# Patient Record
Sex: FEMALE | Race: WHITE
[De-identification: names, ages, dates, MRNs, and addresses within clinical notes are randomized per-mention and may not be internally consistent; named-entity substitution may affect disease eponyms.]

---

## 2018-03-13 ENCOUNTER — HOSPITAL ENCOUNTER (INPATIENT)
Dept: HOSPITAL 36 - TELE | Age: 78
LOS: 6 days | Discharge: NURSING, REHAB OR LONG-TERM CARE | DRG: 177 | End: 2018-03-19
Attending: FAMILY MEDICINE | Admitting: FAMILY MEDICINE
Payer: MEDICARE

## 2018-03-13 VITALS — DIASTOLIC BLOOD PRESSURE: 35 MMHG | SYSTOLIC BLOOD PRESSURE: 99 MMHG

## 2018-03-13 DIAGNOSIS — G47.33: ICD-10-CM

## 2018-03-13 DIAGNOSIS — M81.0: ICD-10-CM

## 2018-03-13 DIAGNOSIS — J69.0: Primary | ICD-10-CM

## 2018-03-13 DIAGNOSIS — E66.01: ICD-10-CM

## 2018-03-13 DIAGNOSIS — J44.1: ICD-10-CM

## 2018-03-13 DIAGNOSIS — I49.9: ICD-10-CM

## 2018-03-13 DIAGNOSIS — M54.17: ICD-10-CM

## 2018-03-13 DIAGNOSIS — J44.0: ICD-10-CM

## 2018-03-13 DIAGNOSIS — I50.9: ICD-10-CM

## 2018-03-13 DIAGNOSIS — N18.2: ICD-10-CM

## 2018-03-13 DIAGNOSIS — I25.110: ICD-10-CM

## 2018-03-13 DIAGNOSIS — E11.22: ICD-10-CM

## 2018-03-13 DIAGNOSIS — J96.00: ICD-10-CM

## 2018-03-13 DIAGNOSIS — I42.9: ICD-10-CM

## 2018-03-13 DIAGNOSIS — I13.0: ICD-10-CM

## 2018-03-13 PROCEDURE — X6118: HCPCS

## 2018-03-13 PROCEDURE — Z7610: HCPCS

## 2018-03-14 LAB
ALBUMIN SERPL-MCNC: 3.7 GM/DL (ref 3.7–5.3)
ALBUMIN/GLOB SERPL: 1.2 {RATIO} (ref 1–1.8)
ALP SERPL-CCNC: 75 U/L (ref 34–104)
ALT SERPL-CCNC: 23 U/L (ref 7–52)
ANION GAP SERPL CALC-SCNC: 11.6 MMOL/L (ref 7–16)
ANION GAP SERPL CALC-SCNC: 7.9 MMOL/L (ref 7–16)
AST SERPL-CCNC: 24 U/L (ref 13–39)
BASOPHILS # BLD AUTO: 0 TH/CUMM (ref 0–0.2)
BASOPHILS # BLD AUTO: 0.1 TH/CUMM (ref 0–0.2)
BASOPHILS NFR BLD AUTO: 0.1 % (ref 0–2)
BASOPHILS NFR BLD AUTO: 1.1 % (ref 0–2)
BILIRUB SERPL-MCNC: 0.5 MG/DL (ref 0.3–1)
BUN SERPL-MCNC: 21 MG/DL (ref 7–25)
BUN SERPL-MCNC: 22 MG/DL (ref 7–25)
CALCIUM SERPL-MCNC: 9.5 MG/DL (ref 8.6–10.3)
CALCIUM SERPL-MCNC: 9.8 MG/DL (ref 8.6–10.3)
CHLORIDE SERPL-SCNC: 100 MEQ/L (ref 98–107)
CHLORIDE SERPL-SCNC: 102 MEQ/L (ref 98–107)
CHOLEST SERPL-MCNC: 243 MG/DL (ref ?–200)
CHOLEST SERPL-MCNC: 249 MG/DL (ref ?–200)
CO2 SERPL-SCNC: 27.6 MEQ/L (ref 21–31)
CO2 SERPL-SCNC: 28.8 MEQ/L (ref 21–31)
CREAT SERPL-MCNC: 0.9 MG/DL (ref 0.6–1.2)
CREAT SERPL-MCNC: 1 MG/DL (ref 0.6–1.2)
EOSINOPHIL # BLD AUTO: 0 TH/CMM (ref 0.1–0.4)
EOSINOPHIL # BLD AUTO: 0 TH/CMM (ref 0.1–0.4)
EOSINOPHIL NFR BLD AUTO: 0.1 % (ref 0–5)
EOSINOPHIL NFR BLD AUTO: 0.1 % (ref 0–5)
ERYTHROCYTE [DISTWIDTH] IN BLOOD BY AUTOMATED COUNT: 16.5 % (ref 11.5–20)
ERYTHROCYTE [DISTWIDTH] IN BLOOD BY AUTOMATED COUNT: 16.7 % (ref 11.5–20)
GLOBULIN SER-MCNC: 3.2 GM/DL
GLUCOSE SERPL-MCNC: 191 MG/DL (ref 70–105)
GLUCOSE SERPL-MCNC: 302 MG/DL (ref 70–105)
HBA1C MFR BLD: 7.9 % (ref 4–6)
HCT VFR BLD CALC: 34 % (ref 41–60)
HCT VFR BLD CALC: 35.2 % (ref 41–60)
HDLC SERPL-MCNC: 45 MG/DL (ref 23–92)
HDLC SERPL-MCNC: 48 MG/DL (ref 23–92)
HGB BLD-MCNC: 11.3 GM/DL (ref 12–16)
HGB BLD-MCNC: 11.6 GM/DL (ref 12–16)
HGB BLD-MCNC: 12 G/DL (ref 4–35)
LYMPHOCYTE AB SER FC-ACNC: 0.5 TH/CMM (ref 1.5–3)
LYMPHOCYTE AB SER FC-ACNC: 1 TH/CMM (ref 1.5–3)
LYMPHOCYTES NFR BLD AUTO: 13.5 % (ref 20–50)
LYMPHOCYTES NFR BLD AUTO: 8.6 % (ref 20–50)
MCH RBC QN AUTO: 28.1 PG (ref 27–31)
MCH RBC QN AUTO: 28.2 PG (ref 27–31)
MCHC RBC AUTO-ENTMCNC: 32.8 PG (ref 28–36)
MCHC RBC AUTO-ENTMCNC: 33.3 PG (ref 28–36)
MCV RBC AUTO: 84.9 FL (ref 81–100)
MCV RBC AUTO: 85.6 FL (ref 81–100)
MONOCYTES # BLD AUTO: 0.1 TH/CMM (ref 0.3–1)
MONOCYTES # BLD AUTO: 0.4 TH/CMM (ref 0.3–1)
MONOCYTES NFR BLD AUTO: 1.8 % (ref 2–10)
MONOCYTES NFR BLD AUTO: 5.9 % (ref 2–10)
NEUTROPHILS # BLD: 5 TH/CMM (ref 1.8–8)
NEUTROPHILS # BLD: 6 TH/CMM (ref 1.8–8)
NEUTROPHILS NFR BLD AUTO: 79.4 % (ref 40–80)
NEUTROPHILS NFR BLD AUTO: 89.4 % (ref 40–80)
PLATELET # BLD: 134 TH/CMM (ref 150–400)
PLATELET # BLD: 145 TH/CMM (ref 150–400)
PMV BLD AUTO: 9.3 FL
PMV BLD AUTO: 9.4 FL
POTASSIUM SERPL-SCNC: 3.7 MEQ/L (ref 3.5–5.1)
POTASSIUM SERPL-SCNC: 4.2 MEQ/L (ref 3.5–5.1)
RBC # BLD AUTO: 4.01 MIL/CMM (ref 3.8–5.2)
RBC # BLD AUTO: 4.12 MIL/CMM (ref 3.8–5.2)
SODIUM SERPL-SCNC: 135 MEQ/L (ref 136–145)
SODIUM SERPL-SCNC: 135 MEQ/L (ref 136–145)
TRIGL SERPL-MCNC: 128 MG/DL (ref ?–150)
TRIGL SERPL-MCNC: 137 MG/DL (ref ?–150)
WBC # BLD AUTO: 5.6 TH/CMM (ref 4.8–10.8)
WBC # BLD AUTO: 7.5 TH/CMM (ref 4.8–10.8)

## 2018-03-14 PROCEDURE — 5A09357 ASSISTANCE WITH RESPIRATORY VENTILATION, LESS THAN 24 CONSECUTIVE HOURS, CONTINUOUS POSITIVE AIRWAY PRESSURE: ICD-10-PCS | Performed by: FAMILY MEDICINE

## 2018-03-14 RX ADMIN — LACTULOSE PRN GM: 20 SOLUTION ORAL at 12:14

## 2018-03-14 RX ADMIN — IPRATROPIUM BROMIDE AND ALBUTEROL SULFATE SCH ML: .5; 3 SOLUTION RESPIRATORY (INHALATION) at 12:22

## 2018-03-14 RX ADMIN — IPRATROPIUM BROMIDE AND ALBUTEROL SULFATE SCH ML: .5; 3 SOLUTION RESPIRATORY (INHALATION) at 07:51

## 2018-03-14 RX ADMIN — INSULIN ASPART SCH UNITS: 100 INJECTION, SOLUTION INTRAVENOUS; SUBCUTANEOUS at 18:09

## 2018-03-14 RX ADMIN — HYDROCODONE BITATRATE AND ACETAMINOPHEN PRN TAB: 10; 325 TABLET ORAL at 11:12

## 2018-03-14 RX ADMIN — INSULIN ASPART SCH UNITS: 100 INJECTION, SOLUTION INTRAVENOUS; SUBCUTANEOUS at 00:43

## 2018-03-14 RX ADMIN — PANTOPRAZOLE SODIUM SCH MG: 40 TABLET, DELAYED RELEASE ORAL at 16:16

## 2018-03-14 RX ADMIN — INSULIN ASPART SCH UNITS: 100 INJECTION, SOLUTION INTRAVENOUS; SUBCUTANEOUS at 12:39

## 2018-03-14 RX ADMIN — IPRATROPIUM BROMIDE AND ALBUTEROL SULFATE SCH ML: .5; 3 SOLUTION RESPIRATORY (INHALATION) at 19:40

## 2018-03-14 RX ADMIN — INSULIN ASPART SCH UNITS: 100 INJECTION, SOLUTION INTRAVENOUS; SUBCUTANEOUS at 08:47

## 2018-03-14 RX ADMIN — IPRATROPIUM BROMIDE AND ALBUTEROL SULFATE SCH ML: .5; 3 SOLUTION RESPIRATORY (INHALATION) at 01:18

## 2018-03-14 NOTE — DIAGNOSTIC IMAGING REPORT
CHEST X-RAY: AP view



INDICATION: Pneumonia



COMPARISON: None



FINDINGS: Congestive changes are seen.  There may be a small left

effusion.  Cardiomegaly is noted with atherosclerosis.  Degenerative

changes of the spine are noted.



IMPRESSION: Congestive changes.  Pneumonia of the mid to lower lungs is

cannot be excluded.



Cardiomegaly and atherosclerotic vascular disease.

## 2018-03-14 NOTE — CONSULTATION
DATE OF CONSULTATION:  03/14/2018



PATIENT OF:  Dr. Urena.



HISTORY AND PHYSICAL:  This is a 77-year-old morbidly obese female patient,

recently had been complaining of shortness of breath, cough with expectoration. 

The patient was seen in the Emergency Room at [facility].  The patient was

found to have pneumonia.  The patient also had slightly elevated troponin level.

 Following this, the patient was transferred to [facility] due

to insurance reasons.



PAST MEDICAL HISTORY:  Hypertension, obstructive sleep apnea, angina,

lumbosacral radiculopathy, diabetes mellitus type 2, diabetic CKD stage II,

osteoporosis, and morbid obesity.



FAMILY HISTORY:  Unremarkable.



SOCIAL HISTORY:  No history of smoking, alcohol abuse.



ALLERGIES:  No known allergies.



PHYSICAL EXAMINATION:

VITAL SIGNS:  Blood pressure 130/70, pulse 70, and respirations 20.

HEAD:  Normocephalic.  No lumps or bumps.

EYES:  Pupils are equal and reactive to light.  Fundi show AV nicking.  Sclerae

white.  Conjunctivae pink.

NECK:  Carotid 2+.  Normal upstroke.  JVD flat. Thyroid not palpable.  Lymph

nodes not palpable.

CHEST:  Shows increased AP diameter.  No kyphosis or scoliosis.

LUNGS:  Bilateral bronchovesicular breath sounds.  Bilateral wheezing, rhonchi,

prolonged expiration.

HEART:  PMI fifth intercostal space with lateral to midclavicular line.  S1 and

S2.  No S3.  Soft S4.  Soft systolic murmur.

ABDOMEN:  Soft.  Liver and spleen not palpable.  Morbid obesity, no

organomegaly.

EXTREMITIES:  Peripheral pulses 1+.  No pedal edema.



CLINICAL IMPRESSION:  Pneumonia; acute respiratory failure, on BiPAP;

hypertension; obstructive sleep apnea; angina; lumbosacral radiculopathy;

diabetes mellitus type 2; diabetic CKD stage II; morbid obesity; and

osteoporosis.



PLAN:  We will get troponin level, EKG, and echocardiogram.  IV antibiotics and

monitor the patient.





DD: 03/14/2018 13:44

DT: 03/14/2018 19:37

[facility]# [id number]  [id number]

## 2018-03-14 NOTE — INTERNAL MEDICINE PROG NOTE
Internal Medicine Objective





- Results


Result Diagrams: 


 18 05:42





 18 05:42


Recent Labs: 


 Laboratory Last Values











WBC  5.6 Th/cmm (4.8-10.8)   18  05:42    


 


RBC  4.01 Mil/cmm (3.80-5.20)   18  05:42    


 


Hgb  11.3 gm/dL (12-16)  L  18  05:42    


 


Hct  34.0 % (41.0-60)  L  18  05:42    


 


MCV  84.9 fl ()   18  05:42    


 


MCH  28.2 pg (27.0-31.0)   18  05:42    


 


MCHC Differential  33.3 pg (28.0-36.0)   18  05:42    


 


RDW  16.7 % (11.5-20.0)   18  05:42    


 


Plt Count  134 Th/cmm (150-400)  L  18  05:42    


 


MPV  9.4 fl  18  05:42    


 


Neutrophils %  89.4 % (40.0-80.0)  H  18  05:42    


 


Lymphocytes %  8.6 % (20.0-50.0)  L  18  05:42    


 


Monocytes %  1.8 % (2.0-10.0)  L  18  05:42    


 


Eosinophils %  0.1 % (0.0-5.0)   18  05:42    


 


Basophils %  0.1 % (0.0-2.0)   18  05:42    


 


Sodium  135 mEq/L (136-145)  L  18  05:42    


 


Potassium  4.2 mEq/L (3.5-5.1)   18  05:42    


 


Chloride  100 mEq/L ()   18  05:42    


 


Carbon Dioxide  27.6 mEq/L (21.0-31.0)   18  05:42    


 


Anion Gap  11.6  (7.0-16.0)   18  05:42    


 


BUN  21 mg/dL (7-25)   18  05:42    


 


Creatinine  1.0 mg/dL (0.6-1.2)   18  05:42    


 


Est GFR ( Amer)  TNP   18  05:42    


 


Est GFR (Non-Af Amer)  TNP   18  05:42    


 


BUN/Creatinine Ratio  21.0   18  05:42    


 


Glucose  302 mg/dL ()  H  18  05:42    


 


POC Glucose  248 MG/DL (70 - 105)  H  18  16:15    


 


Calcium  9.5 mg/dL (8.6-10.3)   18  05:42    


 


Total Bilirubin  0.5 mg/dL (0.3-1.0)   18  05:42    


 


AST  24 U/L (13-39)   18  05:42    


 


ALT  23 U/L (7-52)   18  05:42    


 


Alkaline Phosphatase  75 U/L ()   18  05:42    


 


B-Natriuretic Peptide  399.0 pg/mL (5.0-100.0)  H  18  05:42    


 


Total Protein  6.9 gm/dL (6.0-8.3)   18  05:42    


 


Albumin  3.7 gm/dL (3.7-5.3)   18  05:42    


 


Globulin  3.2 gm/dL  18  05:42    


 


Albumin/Globulin Ratio  1.2  (1.0-1.8)   18  05:42    


 


Triglycerides  128 mg/dL (<150)   18  05:42    


 


Cholesterol  243 mg/dL (<200)  H  18  05:42    


 


LDL Cholesterol Direct  160 mg/dL ()   18  05:42    


 


HDL Cholesterol  45 mg/dL (23-92)   18  05:42    














- Physical Exam


Vitals and I&O: 


 Vital Signs











Temp  97.7 F   18 16:00


 


Pulse  82   18 16:00


 


Resp  17   18 16:52


 


BP  116/62   18 16:00


 


Pulse Ox  96   18 16:00








 Intake & Output











 18





 18:59 06:59 18:59


 


Weight (lbs)  105.687 kg 











Active Medications: 


Current Medications





Acetaminophen (Tylenol)  650 mg PO Q4H PRN


   PRN Reason: Pain (Mild)


   Stop: 18 08:49


Acetaminophen/Hydrocodone Bitart (Norco 10 Mg/325 Mg)  1 tab PO Q6HR PRN


   PRN Reason: Pain (Moderate)


   Stop: 18 09:55


   Last Admin: 18 11:12 Dose:  1 tab


Albuterol Sulfate (Albuterol 2.5mg/3ml Neb Ud)  2.5 mg HHN DAILYRT PRN


   PRN Reason: wheezing


   Stop: 18 09:59


Albuterol/Ipratropium (Duoneb Neb)  3 ml HHN Q6HRT [facility]


   Stop: 18 00:59


   Last Admin: 18 12:22 Dose:  3 ml


Amiodarone HCl (Cordarone)  100 mg PO DAILY [facility]


   Stop: 18 08:59


Artificial Tears (Artificial Tears Ophth Soln)  1 drop EACH EYE DAILY [facility]


   Stop: 18 08:59


Aspirin (Ecotrin)  81 mg PO DAILY [facility]


   Stop: 18 08:59


Bisacodyl (Dulcolax 5 Mg Ec Tab)  5 mg PO DAILY [facility]


   Stop: 18 08:59


Docusate Sodium (Colace)  100 mg PO BID PRN


   PRN Reason: Constipation


   Stop: 18 09:55


Furosemide (Lasix)  40 mg PO DAILY [facility]


   Stop: 18 08:59


Gabapentin (Neurontin)  1,200 mg PO TID [facility]


   Stop: 18 13:59


   Last Admin: 18 13:27 Dose:  1,200 mg


Insulin Aspart (Novolog Insulin Sliding Scale)  0 units SUBQ Q6HR [facility]


   PRN Reason: Protocol


   Stop: 18 00:00


   Last Admin: 18 12:39 Dose:  8 units


Insulin Detemir (Levemir Insulin)   units SUBQ ACHS [facility]


   PRN Reason: Protocol


   Stop: 18 11:29


Lactulose (Cephulac)  20 gm PO DAILY PRN


   PRN Reason: Constipation


   Stop: 18 09:55


   Last Admin: 18 12:14 Dose:  20 gm


Levothyroxine Sodium (Synthroid)  0.125 mg PO QDAC [facility]


   Stop: 18 07:29


Metformin HCl (Glucophage)  1,000 mg PO BID [facility]


   Stop: 18 16:59


   Last Admin: 18 16:16 Dose:  1,000 mg


Miscellaneous (Clonazepam [Klonopin])  1 tab PO HS [facility]


   Stop: 18 20:59


Miscellaneous (Glipizide [Glucotrol])  1 tab PO BID [facility]


   Stop: 18 16:59


   Last Admin: 18 16:16 Dose:  Not Given


Pantoprazole Sodium (Protonix)  40 mg PO BID [facility]


   Stop: 18 16:59


   Last Admin: 18 16:16 Dose:  40 mg


Potassium Chloride (Klor-Con)  20 meq PO DAILY [facility]


   Stop: 18 08:59


Sitagliptin Phosphate (Januvia)  50 mg PO BID [facility]


   Stop: 18 16:59


   Last Admin: 18 16:16 Dose:  50 mg


Tramadol HCl (Ultram)  50 mg PO TID [facility]


   Stop: 18 13:59


   Last Admin: 18 13:26 Dose:  50 mg











Nutritional Asmnt/Malnutr-PDOC





- Dietary Evaluation


Malnutrition Findings (Please click <Entered> for more info): 








Nutritional Asmnt/Malnutrition                             Start:  18 15:

31


Text:                                                      Status: Complete    

  


Freq:                                                                          

  


 Document     18 15:31  LCHENG  (Rec: 18 15:43  LCHENG  [id number])


 Nutritional Asmnt/Malnutrition


     Patient General Information


      Nutritional Screening                      High Risk


      Diagnosis                                  PNA


      Pertinent Medical Hx/Surgical Hx           no H&P, not able to obtain


      Subjective Information                      at admitting noted. pt


                                                 seen lying in bed with lunch


                                                 tray in her front. Pt is


                                                 Cam[place] speaking. Per RN, pt


                                                 consumed <25% of lunch and


                                                 refused to eat, complaining


                                                 stomach pain. PO intake 50% of


                                                 breakfast this morning. Pt is


                                                 on insulin noted.


      Current Diet Order/ Nutrition Support      low sodium 2gm


      Pertinent Medications                      lasix, novolog, levemir,


                                                 synthroid, glucophage,


                                                 protonix, kcal


      Pertinent Labs                             3/14 Na 135, Glucose 302, POC


                                                 277-346


                                                 3/13 


     Nutritional Hx/Data


      Height                                     1.47 m


      Height (Calculated Centimeters)            147.3


      Current Weight (lbs)                       105.687 kg


      Weight (Calculated Kilograms)              105.7


      Weight (Calculated Grams)                  865865.0


      Ideal Body Weight                          96


      Body Mass Index (BMI)                      48.6


      Weight Status                              Obese


     GI Symptoms


      GI Symptoms                                None


      Last BM                                    no record


      Difficult in:                              None


      Skin Integrity/Comment:                    intact


      Current %PO                                Poor (25-49%)


     Estimated Nutritional Goals


      BEE in Kcals:                              Adj wt of IBW


      Calories/Kcals/Kg                          30-35


      Kcals Calculated                           7091-2450


      Protein:                                   Adj wt of IBW


      Protein g/k-1.2


      Protein Calculated                         59-71


      Fluid: ml                                  1770-2065ml (1ml/kcal)


     Nutritional Problem


      1. Problem


       Problem                                   altered nutrition related labs


       Etiology                                  endocrine dysfunction


       Signs/Symptoms:                           Glucose 302, -346


     Malnutrition Alert


      Protein-Calorie Malnutrition               N/A


      Is there a minimum of two criteria         No


       selected?                                 


       Query Text:Check all the applicable       


       criteria. A minimum of two criteria are   


       recommended for diagnosis of either       


       severe or non-severe malnutrition.        


     Intervention/Recommendation


      Comments                                   1. Recommend adding CCHO-60gm


                                                 diet d/t hyperglycemia and on


                                                 insulin. RN notified, will


                                                 talk to MD.


                                                 2. Monitor PO intake, wt, labs


                                                 and skin integrity


                                                 3. F/U as high risk in 2-3


                                                 days, 3/16-3/17


     Expected Outcomes/Goals


      Expected Outcomes/Goals                    1. PO intake to meet at least


                                                 75% of nutritional needs.


                                                 2. Wt stability, skin to


                                                 remain intact, labs to


                                                 approach WNL.

## 2018-03-15 LAB
ANION GAP SERPL CALC-SCNC: 14 MMOL/L (ref 7–16)
BASOPHILS # BLD AUTO: 0 TH/CUMM (ref 0–0.2)
BASOPHILS NFR BLD AUTO: 0.1 % (ref 0–2)
BUN SERPL-MCNC: 23 MG/DL (ref 7–25)
CALCIUM SERPL-MCNC: 9.7 MG/DL (ref 8.6–10.3)
CHLORIDE SERPL-SCNC: 101 MEQ/L (ref 98–107)
CO2 SERPL-SCNC: 28.1 MEQ/L (ref 21–31)
CREAT SERPL-MCNC: 1 MG/DL (ref 0.6–1.2)
EOSINOPHIL # BLD AUTO: 0 TH/CMM (ref 0.1–0.4)
EOSINOPHIL NFR BLD AUTO: 0.5 % (ref 0–5)
ERYTHROCYTE [DISTWIDTH] IN BLOOD BY AUTOMATED COUNT: 16.8 % (ref 11.5–20)
GLUCOSE SERPL-MCNC: 150 MG/DL (ref 70–105)
HCT VFR BLD CALC: 33.8 % (ref 41–60)
HGB BLD-MCNC: 11 GM/DL (ref 12–16)
LYMPHOCYTE AB SER FC-ACNC: 1.2 TH/CMM (ref 1.5–3)
LYMPHOCYTES NFR BLD AUTO: 16.3 % (ref 20–50)
MCH RBC QN AUTO: 27.6 PG (ref 27–31)
MCHC RBC AUTO-ENTMCNC: 32.4 PG (ref 28–36)
MCV RBC AUTO: 85.1 FL (ref 81–100)
MONOCYTES # BLD AUTO: 0.4 TH/CMM (ref 0.3–1)
MONOCYTES NFR BLD AUTO: 5.7 % (ref 2–10)
NEUTROPHILS # BLD: 5.7 TH/CMM (ref 1.8–8)
NEUTROPHILS NFR BLD AUTO: 77.4 % (ref 40–80)
PLATELET # BLD: 147 TH/CMM (ref 150–400)
PMV BLD AUTO: 10.2 FL
POTASSIUM SERPL-SCNC: 4.1 MEQ/L (ref 3.5–5.1)
RBC # BLD AUTO: 3.97 MIL/CMM (ref 3.8–5.2)
SODIUM SERPL-SCNC: 139 MEQ/L (ref 136–145)
WBC # BLD AUTO: 7.3 TH/CMM (ref 4.8–10.8)

## 2018-03-15 RX ADMIN — LACTULOSE PRN GM: 20 SOLUTION ORAL at 12:50

## 2018-03-15 RX ADMIN — INSULIN ASPART SCH UNITS: 100 INJECTION, SOLUTION INTRAVENOUS; SUBCUTANEOUS at 00:53

## 2018-03-15 RX ADMIN — POLYVINYL ALCOHOL SCH DROP: 14 SOLUTION/ DROPS OPHTHALMIC at 09:28

## 2018-03-15 RX ADMIN — INSULIN ASPART SCH UNITS: 100 INJECTION, SOLUTION INTRAVENOUS; SUBCUTANEOUS at 17:53

## 2018-03-15 RX ADMIN — PANTOPRAZOLE SODIUM SCH MG: 40 TABLET, DELAYED RELEASE ORAL at 09:21

## 2018-03-15 RX ADMIN — PANTOPRAZOLE SODIUM SCH MG: 40 TABLET, DELAYED RELEASE ORAL at 16:22

## 2018-03-15 RX ADMIN — DEXTROMETHORPHAN HBR AND GUAIFENESIN PRN ML: 10; 100 SOLUTION ORAL at 00:52

## 2018-03-15 RX ADMIN — INSULIN ASPART SCH UNITS: 100 INJECTION, SOLUTION INTRAVENOUS; SUBCUTANEOUS at 06:54

## 2018-03-15 RX ADMIN — IPRATROPIUM BROMIDE AND ALBUTEROL SULFATE SCH ML: .5; 3 SOLUTION RESPIRATORY (INHALATION) at 07:23

## 2018-03-15 RX ADMIN — IPRATROPIUM BROMIDE AND ALBUTEROL SULFATE SCH ML: .5; 3 SOLUTION RESPIRATORY (INHALATION) at 13:19

## 2018-03-15 RX ADMIN — INSULIN ASPART SCH UNITS: 100 INJECTION, SOLUTION INTRAVENOUS; SUBCUTANEOUS at 12:48

## 2018-03-15 RX ADMIN — IPRATROPIUM BROMIDE AND ALBUTEROL SULFATE SCH ML: .5; 3 SOLUTION RESPIRATORY (INHALATION) at 01:15

## 2018-03-15 RX ADMIN — IPRATROPIUM BROMIDE AND ALBUTEROL SULFATE SCH ML: .5; 3 SOLUTION RESPIRATORY (INHALATION) at 19:29

## 2018-03-15 RX ADMIN — POTASSIUM CHLORIDE SCH MEQ: 20 TABLET, EXTENDED RELEASE ORAL at 09:21

## 2018-03-15 RX ADMIN — DEXTROMETHORPHAN HBR AND GUAIFENESIN PRN ML: 10; 100 SOLUTION ORAL at 09:27

## 2018-03-15 RX ADMIN — HYDROCODONE BITATRATE AND ACETAMINOPHEN PRN TAB: 10; 325 TABLET ORAL at 12:50

## 2018-03-15 RX ADMIN — LEVOTHYROXINE SODIUM SCH: 125 TABLET ORAL at 11:00

## 2018-03-15 NOTE — HISTORY & PHYSICAL
ADMIT DATE:  03/15/2018



CHIEF COMPLAINT:  Shortness of breath.



HISTORY OF PRESENT ILLNESS:  This is a 77-year-old  female who has a

2-day history of shortness of breath associated with productive cough with thick

greenish secretions.  The patient was seen in the Emergency Room at [facility].  Due to insurance purposes, the patient is now admitted here to

[facility].



PAST MEDICAL HISTORY:  Hypertension, obstructive sleep apnea, angina,

lumbosacral radiculopathy, diabetes type 2, diabetic CKD stage 2, osteoporosis,

morbid obesity.



FAMILY HISTORY:  Noncontributory.



SOCIAL HISTORY:  Denies any alcohol, illicit drug usage or any tobacco smoking.



ALLERGIES:  No drug allergies.



REVIEW OF SYSTEMS:

GENERAL:  Denies any fevers and chills.

CARDIOVASCULAR:  Denies chest pain.

RESPIRATORY:  The patient complains of shortness of breath and productive cough.

GASTROINTESTINAL:  Denies nausea, vomiting, abdominal pain.

GENITOURINARY:  Denies increased frequency or dysuria.

NEUROLOGIC:  No headaches, seizures or syncope.

All other systems are reviewed and are negative.



PHYSICAL EXAMINATION:  This is a 77-year-old female, awake, alert and appears

stated age, in no apparent distress.

VITAL SIGNS:  Temperature 98.7, heart rate 83, blood pressure 115/63,

respirations 17, O2 96%.

HEENT:  Head; normocephalic, atraumatic.

NECK:  Supple.  No mass.

LUNGS:  Rhonchi bilaterally with some slight wheezes.

HEART:  No murmurs, no gallops.

ABDOMEN:  Soft, nontender, nondistended.



LABORATORY DATA:  WBC 7.3, H and H 11.0 and 32.8, platelet of 147.



Sodium 139, potassium 4.1, chloride of 101, BUN 23, creatinine 1.0.  Troponin

0.08.



DIAGNOSTICS:  The patient had a chest x-ray done.  Impression is congestive

changes and pneumonia of mid to lower lung cannot be excluded, cardiomegaly,

atherosclerotic vascular disease.



ASSESSMENT:  Pneumonia, obstructive sleep apnea on BiPAP, hypertension, morbid

obesity.  Lumbosacral radiculopathy, elevated troponin, type 2 diabetes,

diabetic CKD stage 2, osteoporosis.



PLAN:  We will get Pulmonology on the case. We will get Cardiology as well,

breathing treatments, BiPAP standby.  We will continue to follow this patient.





DD: 03/15/2018 13:59

DT: 03/15/2018 16:04

[facility]# [id number]  [id number]

## 2018-03-15 NOTE — HISTORY & PHYSICAL
ADMIT DATE:  



HISTORY OF PRESENT ILLNESS:  A 77-year-old female patient.  She presented to the

[place] Emergency Room initially with a 3-day history of cough, fever and

shortness of breath, was diagnosed to have pneumonia and had also COPD

exacerbation and I got a call from them and the patient was transferred to

[facility] for continuation of her care.



PAST MEDICAL HISTORY:  Obstructive sleep apnea, history of diabetes, history of

hypertension, history of coronary artery disease, and history of lumbar

radiculopathy.



ALLERGIES:  NOTED IN THE CHART.



SOCIAL HISTORY:  Does not smoke.



REVIEW OF SYSTEMS:  Other than shortness of breath, fever for the last several

days, everything else was negative.



PHYSICAL EXAMINATION:

GENERAL:  The patient is an elderly female, very obese.  She is slightly short

of breath, complaining of headache at this time.

HEAD:  Normal.

ENT:  Normal.

NECK:  Supple and nontender.

LUNGS:  Bilateral rhonchi.

CARDIOVASCULAR SYSTEM:  S1, S2 heard.

ABDOMEN:  Soft.  Bowel sounds are heard.

NEUROLOGIC:  The patient is alert, oriented.

VITAL SIGNS:  Pulse was high and blood pressure was 107/62.



LABORATORY DATA:  I reviewed her other labs from [place], was normal except

potassium was 3.4.



DIAGNOSES:  Acute respiratory distress, respiratory insufficiency, left lower

lobe infiltrate, history of obesity, history of obstructive sleep apnea, history

of lumbar radiculopathy, history of neuropathy, history of diabetes, history of

organ dysfunction.  The patient was admitted and I will go ahead and continue

her antibiotics.  I will have ID doctor, Dr. Barnard see the patient as well as I

will have Dr. Yvon Barnard for Pulmonary.  I will follow the patient.





DD: 03/15/2018 08:17

DT: 03/15/2018 11:14

[facility]# [id number]  [id number]

## 2018-03-15 NOTE — GENERAL PROGRESS NOTE
Subjective





- Review of Systems


Events since last encounter: 





patient with cough + sob 


 pneumonia 





Objective





- Results


Result Diagrams: 


 18 20:21





 18 20:21


Recent Labs: 


 Laboratory Last Values











WBC  7.5 Th/cmm (4.8-10.8)  D 18  20:21    


 


RBC  4.12 Mil/cmm (3.80-5.20)   18  20:21    


 


Hgb  11.6 gm/dL (12-16)  L  18  20:21    


 


Hct  35.2 % (41.0-60)  L  18  20:21    


 


MCV  85.6 fl ()   18  20:    


 


MCH  28.1 pg (27.0-31.0)   18  20:    


 


MCHC Differential  32.8 pg (28.0-36.0)   18  20:    


 


RDW  16.5 % (11.5-20.0)   18  20:    


 


Plt Count  145 Th/cmm (150-400)  L  18  20:21    


 


MPV  9.3 fl  18  20:21    


 


Neutrophils %  79.4 % (40.0-80.0)   18  20:21    


 


Lymphocytes %  13.5 % (20.0-50.0)  L  18  20:    


 


Monocytes %  5.9 % (2.0-10.0)   18  20:21    


 


Eosinophils %  0.1 % (0.0-5.0)   18  20:    


 


Basophils %  1.1 % (0.0-2.0)   18  20:21    


 


Sodium  135 mEq/L (136-145)  L  18  20:21    


 


Potassium  3.7 mEq/L (3.5-5.1)   18  20:21    


 


Chloride  102 mEq/L ()   18  20:21    


 


Carbon Dioxide  28.8 mEq/L (21.0-31.0)   18  20:21    


 


Anion Gap  7.9  (7.0-16.0)   18  20:21    


 


BUN  22 mg/dL (7-25)   18  20:21    


 


Creatinine  0.9 mg/dL (0.6-1.2)   18  20:21    


 


Est GFR ( Amer)  TNP   18  20:21    


 


Est GFR (Non-Af Amer)  TNP   18  20:21    


 


BUN/Creatinine Ratio  24.4   18  20:21    


 


Glucose  191 mg/dL ()  H D 18  20:21    


 


POC Glucose  159 MG/DL (70 - 105)  H  03/15/18  00:42    


 


Hemoglobin A1c %  7.9 % (4.0-6.0)  H  18  05:42    


 


Calcium  9.8 mg/dL (8.6-10.3)   18  20:21    


 


Total Bilirubin  0.5 mg/dL (0.3-1.0)   18  05:42    


 


AST  24 U/L (13-39)   18  05:42    


 


ALT  23 U/L (7-52)   18  05:42    


 


Alkaline Phosphatase  75 U/L ()   18  05:42    


 


B-Natriuretic Peptide  558.0 pg/mL (5.0-100.0)  H  03/15/18  06:02    


 


Total Protein  6.9 gm/dL (6.0-8.3)   18  05:42    


 


Albumin  3.7 gm/dL (3.7-5.3)   18  05:42    


 


Globulin  3.2 gm/dL  18  05:42    


 


Albumin/Globulin Ratio  1.2  (1.0-1.8)   18  05:42    


 


Triglycerides  137 mg/dL (<150)   18  20:21    


 


Cholesterol  249 mg/dL (<200)  H  18  20:21    


 


LDL Cholesterol Direct  174 mg/dL ()   18  20:21    


 


HDL Cholesterol  48 mg/dL (23-92)   18  20:21    


 


TSH  6.80 uIU/ml (0.34-5.60)  H  18  20:21    














- Physical Exam


Vitals and I&O: 


 Vital Signs











Temp  97.3 F   03/15/18 00:00


 


Pulse  83   03/15/18 07:25


 


Resp  22   03/15/18 07:25


 


BP  114/59   03/15/18 00:00


 


Pulse Ox  98   03/15/18 07:25








 Intake & Output











 03/14/18 03/15/18 03/15/18





 18:59 06:59 18:59


 


Intake Total  550 


 


Balance  550 


 


Weight (lbs)  105.687 kg 


 


Intake:   


 


  Oral  550 


 


Other:   


 


  # Voids  3 


 


  # Bowel Movements  0 











Active Medications: 


Current Medications





Acetaminophen (Tylenol)  650 mg PO Q4H PRN


   PRN Reason: Pain (Mild)


   Stop: 18 08:49


   Last Admin: 03/15/18 06:52 Dose:  650 mg


Acetaminophen/Hydrocodone Bitart (Norco 10 Mg/325 Mg)  1 tab PO Q6HR PRN


   PRN Reason: Pain (Moderate)


   Stop: 18 09:55


   Last Admin: 18 11:12 Dose:  1 tab


Albuterol Sulfate (Albuterol 2.5mg/3ml Neb Ud)  2.5 mg HHN DAILYRT PRN


   PRN Reason: wheezing


   Stop: 18 09:59


Albuterol/Ipratropium (Duoneb Neb)  3 ml HHN Q6HRT WALESKA


   Stop: 18 00:59


   Last Admin: 03/15/18 07:23 Dose:  3 ml


Albuterol/Ipratropium (Duoneb Neb)  3 ml HHN Q2H PRN


   PRN Reason: Wheezing


   Stop: 18 21:48


Amiodarone HCl (Cordarone)  100 mg PO DAILY WALESKA


   Stop: 18 08:59


Artificial Tears (Artificial Tears Ophth Soln)  1 drop EACH EYE DAILY WALESKA


   Stop: 18 08:59


Aspirin (Ecotrin)  81 mg PO DAILY WALESKA


   Stop: 18 08:59


Bisacodyl (Dulcolax 5 Mg Ec Tab)  5 mg PO DAILY WALESKA


   Stop: 18 08:59


Clonazepam (Klonopin)  2 mg PO HS WALESKA


   Stop: 18 20:59


Docusate Sodium (Colace)  100 mg PO BID PRN


   PRN Reason: Constipation


   Stop: 18 09:55


Furosemide (Lasix)  40 mg PO DAILY WALESKA


   Stop: 18 08:59


Gabapentin (Neurontin)  1,200 mg PO TID W[facility]ESKA


   Stop: 18 13:59


   Last Admin: 18 22:45 Dose:  1,200 mg


Glipizide [facility]ucotrol)  [facility]


   Stop: 18 16:59


Guaifenesin/Dextromethorphan (Robitussin Dm)  10 ml PO TID PRN


   PRN Reason: Cough


   Stop: 18 21:49


   Last Admin: 03/15/18 00:52 Dose:  10 ml


Insulin Aspart (Novolog Insulin Sliding Scale)  0 units SUBQ Q6HR WALESKA


   PRN Reason: Protocol


   Stop: 18 00:00


   Last Admin: 03/15/18 06:54 Dose:  2 units


Insulin Detemir (Levemir Insulin)   units SUBQ ACHS WALESKA


   PRN Reason: Protocol


   Stop: 18 11:29


Lactulose (Cephulac)  20 gm PO DAILY PRN


   PRN Reason: Constipation


   Stop: 18 09:55


   Last Admin: 18 12:14 Dose:  20 gm


Levothyroxine Sodium (Synthroid)  0.125 mg PO [facility]DAC [facility]


   Stop: 18 07:29


Metformin HCl (Glucophage)  1,000 mg PO BID [facility]


   Stop: 18 16:59


   Last Admin: 18 16:16 Dose:  1,000 mg


Miscellaneous (Vancomycin Iv Per Pharmacy)  1 ea MC PRN PRN


   PRN Reason: PROTOCOL


   Stop: 18 05:32


Pantoprazole Sodium (Protonix)  40 mg PO BID [facility]


   Stop: 18 16:59


   Last Admin: 18 16:16 Dose:  40 mg


Potassium Chloride (Klor-Con)  20 meq PO DAILY [facility]


   Stop: 18 08:59


Sitagliptin Phosphate (Januvia)  50 mg PO BID [facility]


   Stop: 18 16:59


   Last Admin: 18 16:16 Dose:  50 mg


Tramadol HCl (Ultram)  50 mg PO TID [facility]


   Stop: 18 13:59


   Last Admin: 18 22:45 Dose:  50 mg











- Procedures


Procedures: 


 Procedures











Procedure Code Date


 


ASSISTANCE WITH RESPIRATORY VENTILATION, <24 HRS, CPAP [id number] 18


 


POS AIRWAY PRESSURE CPAP [id number] 18














Nutritional Asmnt/Malnutr-PDOC





- Dietary Evaluation


Malnutrition Findings (Please click <Entered> for more info): 








Nutritional Asmnt/Malnutrition                             Start:  18 15:

31


Text:                                                      Status: Complete    

  


Freq:                                                                          

  


 Document     18 15:31  LCANGELA  (Rec: 18 15:43  LCANGELA [id number])


 Nutritional Asmnt/Malnutrition


     Patient General Information


      Nutritional Screening                      High Risk


      Diagnosis                                  PNA


      Pertinent Medical Hx/Surgical Hx           no H&P, not able to obtain


      Subjective Information                      at admitting noted. pt


                                                 seen lying in bed with lunch


                                                 tray in her front. Pt is


                                                 Omani speaking. Per RN, pt


                                                 consumed <25% of lunch and


                                                 refused to eat, complaining


                                                 stomach pain. PO intake 50% of


                                                 breakfast this morning. Pt is


                                                 on insulin noted.


      Current Diet Order/ Nutrition Support      low sodium 2gm


      Pertinent Medications                      lasix, novolog, levemir,


                                                 synthroid, glucophage,


                                                 protonix, kcal


      Pertinent Labs                             3/14 Na 135, Glucose 302, POC


                                                 277-346


                                                 3/13 


     Nutritional Hx/Data


      Height                                     1.47 m


      Height (Calculated Centimeters)            147.3


      Current Weight (lbs)                       105.687 kg


      Weight (Calculated Kilograms)              105.7


      Weight (Calculated Grams)                  561988.0


      Ideal Body Weight                          96


      Body Mass Index (BMI)                      48.6


      Weight Status                              Obese


     GI Symptoms


      GI Symptoms                                None


      Last BM                                    no record


      Difficult in:                              None


      Skin Integrity/Comment:                    intact


      Current %PO                                Poor (25-49%)


     Estimated Nutritional Goals


      BEE in Kcals:                              Adj wt of IBW


      Calories/Kcals/Kg                          30-35


      Kcals Calculated                           1264-2713


      Protein:                                   Adj wt of IBW


      Protein g/k-1.2


      Protein Calculated                         59-71


      Fluid: ml                                  1770-2065ml (1ml/kcal)


     Nutritional Problem


      1. Problem


       Problem                                   altered nutrition related labs


       Etiology                                  endocrine dysfunction


       Signs/Symptoms:                           Glucose 302, -346


     Malnutrition Alert


      Protein-Calorie Malnutrition               N/A


      Is there a minimum of two criteria         No


       selected?                                 


       Query Text:Check all the applicable       


       criteria. A minimum of two criteria are   


       recommended for diagnosis of either       


       severe or non-severe malnutrition.        


     Intervention/Recommendation


      Comments                                   1. Recommend adding CCHO-60gm


                                                 diet d/t hyperglycemia and on


                                                 insulin. RN notified, will


                                                 talk to MD.


                                                 2. Monitor PO intake, wt, labs


                                                 and skin integrity


                                                 3. F/U as high risk in 2-3


                                                 days, 3/16-3/17


     Expected Outcomes/Goals


      Expected Outcomes/Goals                    1. PO intake to meet at least


                                                 75% of nutritional needs.


                                                 2. Wt stability, skin to


                                                 remain intact, labs to


                                                 approach WNL.

## 2018-03-15 NOTE — CONSULTATION
Consult Note





- Consult Note


Service Date: 03/15/18


Referring Physician: Jay Urena


Consult Note: 


PHYSICIAN Consultation Note:





Date of Admission: 03/13/18





Purpose of Consultation: Pneumonia.





Chief Complaint: 


Patient AIXA NORRIS was admitted to [facility] Telemetry with PNA & 

TACHYCARDIA.





History of Present Illness:


The patient is a 77-year-old female with a past


medical history of obstructive sleep apnea, diabetes mellitus type 2, CHF,


cardiomyopathy, coronary artery disease, hypertension, history of lumbar


radiculopathy, and history of cardiac arrhythmias, went to [facility] ER


for cough, fever, and shortness of breath.  The patient was diagnosed to have


pneumonia and COPD exacerbation.  The patient was transferred to [facility] for further care.  The patient still complains of shortness


of breath with improvement in her cough.  Antibiotic wise, the patient was


already started on vancomycin and Zosyn.  Chest x-ray showed congestive changes,


pneumonia of mid to lower lung.





Past Medical History: obstructive sleep apnea, diabetes mellitus type 2, CHF,


cardiomyopathy, coronary artery disease, hypertension, history of lumbar


radiculopathy, and history of cardiac arrhythmias





Diagnoses





TYPE 2 DIABETES MELLITUS W DIABETIC CHRONIC KIDNEY DISEASE (03/13/18)


MORBID (SEVERE) OBESITY DUE TO EXCESS CALORIES (03/13/18)


OBSTRUCTIVE SLEEP APNEA (ADULT) (PEDIATRIC) (03/13/18)


HYPERTENSIVE CHRONIC KIDNEY DISEASE W STG 1-4/UNSP CHR KDNY (03/13/18)


ANGINA PECTORIS, UNSPECIFIED (03/13/18)


PNEUMONIA, UNSPECIFIED ORGANISM (03/13/18)


ACUTE RESPIRATORY FAILURE, UNSP W HYPOXIA OR HYPERCAPNIA (03/13/18)


RADICULOPATHY, LUMBOSACRAL REGION (03/13/18)


AGE-RELATED OSTEOPOROSIS W/O CURRENT PATHOLOGICAL FRACTURE (03/13/18)


CHRONIC KIDNEY DISEASE, STAGE 2 (MILD) (03/13/18)


BODY MASS INDEX (BMI) 50-59.9 , ADULT (03/13/18)





Allergies











Allergy/AdvReac Type Severity Reaction Status Date / Time


 


No Known Allergies Allergy   Verified 03/13/18 23:44








 Vital Signs











Temp  96.7 F   03/15/18 16:00


 


Pulse  91   03/15/18 16:00


 


Resp  17   03/15/18 16:00


 


BP  116/59   03/15/18 16:00


 


Pulse Ox  95   03/15/18 16:00








 Intake & Output











 03/14/18 03/15/18 03/15/18





 18:59 06:59 18:59


 


Intake Total  550 


 


Balance  550 


 


Weight (lbs)  105.687 kg 


 


Intake:   


 


  Oral  550 


 


Other:   


 


  # Voids  3 


 


  # Bowel Movements  0 








 Laboratory Results - last 24 hr











  03/14/18 03/14/18 03/14/18





  05:42 20:21 20:21


 


WBC   7.5  D 


 


RBC   4.12 


 


Hgb   11.6 L 


 


Hct   35.2 L 


 


MCV   85.6 


 


MCH   28.1 


 


MCHC Differential   32.8 


 


RDW   16.5 


 


Plt Count   145 L 


 


MPV   9.3 


 


Neutrophils %   79.4 


 


Lymphocytes %   13.5 L 


 


Monocytes %   5.9 


 


Eosinophils %   0.1 


 


Basophils %   1.1 


 


Sodium   


 


Potassium   


 


Chloride   


 


Carbon Dioxide   


 


Anion Gap   


 


BUN   


 


Creatinine   


 


Est GFR ( Amer)   


 


Est GFR (Non-Af Amer)   


 


BUN/Creatinine Ratio   


 


Glucose   


 


POC Glucose   


 


Hemoglobin A1c %  7.9 H  


 


Calcium   


 


Troponin I   


 


B-Natriuretic Peptide   


 


Triglycerides   


 


Cholesterol   


 


LDL Cholesterol Direct   


 


HDL Cholesterol   


 


TSH    6.80 H














  03/14/18 03/14/18 03/15/18





  20:21 20:21 00:42


 


WBC   


 


RBC   


 


Hgb   


 


Hct   


 


MCV   


 


MCH   


 


MCHC Differential   


 


RDW   


 


Plt Count   


 


MPV   


 


Neutrophils %   


 


Lymphocytes %   


 


Monocytes %   


 


Eosinophils %   


 


Basophils %   


 


Sodium   135 L 


 


Potassium   3.7 


 


Chloride   102 


 


Carbon Dioxide   28.8 


 


Anion Gap   7.9 


 


BUN   22 


 


Creatinine   0.9 


 


Est GFR ( Amer)   TNP 


 


Est GFR (Non-Af Amer)   TNP 


 


BUN/Creatinine Ratio   24.4 


 


Glucose   191 H D 


 


POC Glucose    159 H


 


Hemoglobin A1c %   


 


Calcium   9.8 


 


Troponin I   


 


B-Natriuretic Peptide   


 


Triglycerides  137  


 


Cholesterol  249 H  


 


LDL Cholesterol Direct  174  


 


HDL Cholesterol  48  


 


TSH   














  03/15/18 03/15/18 03/15/18





  06:02 06:02 06:02


 


WBC    7.3


 


RBC    3.97


 


Hgb    11.0 L


 


Hct    33.8 L


 


MCV    85.1


 


MCH    27.6


 


MCHC Differential    32.4


 


RDW    16.8


 


Plt Count    147 L


 


MPV    10.2


 


Neutrophils %    77.4


 


Lymphocytes %    16.3 L


 


Monocytes %    5.7


 


Eosinophils %    0.5


 


Basophils %    0.1


 


Sodium   


 


Potassium   


 


Chloride   


 


Carbon Dioxide   


 


Anion Gap   


 


BUN   


 


Creatinine   


 


Est GFR ( Amer)   


 


Est GFR (Non-Af Amer)   


 


BUN/Creatinine Ratio   


 


Glucose   


 


POC Glucose   


 


Hemoglobin A1c %   


 


Calcium   


 


Troponin I   0.08 H* 


 


B-Natriuretic Peptide  558.0 H  


 


Triglycerides   


 


Cholesterol   


 


LDL Cholesterol Direct   


 


HDL Cholesterol   


 


TSH   














  03/15/18 03/15/18 03/15/18





  06:02 11:01 16:25


 


WBC   


 


RBC   


 


Hgb   


 


Hct   


 


MCV   


 


MCH   


 


MCHC Differential   


 


RDW   


 


Plt Count   


 


MPV   


 


Neutrophils %   


 


Lymphocytes %   


 


Monocytes %   


 


Eosinophils %   


 


Basophils %   


 


Sodium  139  


 


Potassium  4.1  


 


Chloride  101  


 


Carbon Dioxide  28.1  


 


Anion Gap  14.0  


 


BUN  23  


 


Creatinine  1.0  


 


Est GFR ( Amer)  TNP  


 


Est GFR (Non-Af Amer)  TNP  


 


BUN/Creatinine Ratio  23.0  


 


Glucose  150 H  


 


POC Glucose   241 H  172 H


 


Hemoglobin A1c %   


 


Calcium  9.7  


 


Troponin I   


 


B-Natriuretic Peptide   


 


Triglycerides   


 


Cholesterol   


 


LDL Cholesterol Direct   


 


HDL Cholesterol   


 


TSH   








Home Medication











 Medication  Instructions  Recorded  Type


 


Albuterol Sulfate 1 vial HHN DAILY PRN 03/14/18 History


 


Amiodarone HCl [Amiodarone HCl*] 100 mg PO DAILY 03/14/18 History


 


Aspirin EC [Ecotrin] 1 tab PO DAILY 03/14/18 History


 


Bisacodyl [Dulcolax 5 Mg Ec Tab] 1 tab PO DAILY 03/14/18 History


 


Clonazepam [Klonopin] 1 tab PO HS 03/14/18 History


 


Docusate Sodium [Colace] 1 cap PO BID PRN 03/14/18 History


 


Furosemide [Lasix] 40 mg PO DAILY 03/14/18 History


 


Gabapentin [Neurontin] 1,200 mg PO TID 03/14/18 History


 


Glipizide [Glucotrol] 1 tab PO BID 03/14/18 History


 


Hydrocodone/APAP 10 mg/325 mg 1 tab PO Q6HR PRN 03/14/18 History





[Norco 10 mg/325 mg]   


 


Insulin Detemir [Levemir] 0 unit SQ PRN 03/14/18 History


 


Lactulose [Generlac] 20 gm PO PRN 03/14/18 History


 


Levothyroxine [Synthroid] 125 mcg PO DAILY 03/14/18 History


 


Pantoprazole [Protonix] 40 mg PO BID 03/14/18 History


 


Polyethylene Glycol/Polyvinyl 1 drop OP DAILY 03/14/18 History





[Hypotears Eye Drops]   


 


Potassium Chloride ER [Klor-Con] 20 meq PO DAILY 03/14/18 History


 


Sitagliptin Phos/Metformin HCl 1 tab PO BID 03/14/18 History





[Janumet 50-1,000 mg Tablet]   


 


Tramadol HCl [Ultram] 50 mg PO TID 03/14/18 History








Current Medications











Generic Name Dose Route Start Last Admin





  Trade Name Freq  PRN Reason Stop Dose Admin


 


Acetaminophen  650 mg  03/14/18 08:50  03/15/18 06:52





  Tylenol  PO  05/13/18 08:49  650 mg





  Q4H PRN   Administration





  Pain (Mild)   


 


Acetaminophen/Hydrocodone Bitart  1 tab  03/14/18 09:56  03/15/18 12:50





  Norco 10 Mg/325 Mg  PO  05/13/18 09:55  1 tab





  Q6HR PRN   Administration





  Pain (Moderate)   


 


Albuterol Sulfate  2.5 mg  03/14/18 10:00  





  Albuterol 2.5mg/3ml Neb Ud  [facility]  05/13/18 09:59  





  DAILYRT PRN   





  wheezing   


 


Albuterol/Ipratropium  3 ml  03/14/18 01:00  03/15/18 13:19





  Duoneb Neb  [facility]  05/13/18 00:59  3 ml





  Q6HRT WALESKA   Administration


 


Albuterol/Ipratropium  3 ml  03/14/18 21:49  





  Duoneb Neb  [facility]  05/13/18 21:48  





  Q2H PRN   





  Wheezing   


 


Amiodarone HCl  100 mg  03/15/18 09:00  03/15/18 09:21





  Cordarone  PO  05/14/18 08:59  100 mg





  DAILY WALESKA   Administration


 


Artificial Tears  1 drop  03/15/18 09:00  03/15/18 09:28





  Artificial Tears Ophth Soln  EACH EYE  05/14/18 08:59  1 drop





  DAILY WALESKA   Administration


 


Aspirin  81 mg  03/15/18 09:00  03/15/18 09:20





  Ecotrin  PO  05/14/18 08:59  81 mg





  DAILY WALESKA   Administration


 


Bisacodyl  5 mg  03/15/18 09:00  03/15/18 09:21





  Dulcolax 5 Mg Ec Tab  PO  05/14/18 08:59  5 mg





  DAILY WALESKA   Administration


 


Clonazepam  2 mg  03/14/18 21:00  





  Klonopin  PO  05/13/18 20:59  





  HS WALESKA   


 


Docusate Sodium  100 mg  03/14/18 09:56  





  Colace  PO  05/13/18 09:55  





  BID PRN   





  Constipation   


 


Furosemide  40 mg  03/15/18 09:00  03/15/18 09:20





  Lasix  PO  05/14/18 08:59  40 mg





  DAILY WALESKA   Administration


 


Gabapentin  1,200 mg  03/14/18 14:00  03/15/18 14:13





  Neurontin  PO  05/13/18 13:59  1,200 mg





  TID WALESKA   Administration


 


Glipizide  10 mg  03/15/18 07:30  03/15/18 16:22





  Glucotrol  PO  05/13/18 16:59  10 mg





  BIDAC WALESKA   Administration


 


Guaifenesin/Dextromethorphan  10 ml  03/14/18 21:50  03/15/18 09:27





  Robitussin Dm  PO  05/13/18 21:49  10 ml





  TID PRN   Administration





  Cough   


 


Vancomycin HCl 1 gm/ Sodium  250 mls @ 165 mls/hr  03/15/18 12:00  03/15/18 12:

42





  Chloride  IV  05/14/18 11:59  165 mls/hr





  Q12H WALESKA   Administration


 


Piperacillin Sod/Tazobactam  100 mls @ 100 mls/hr  03/15/18 21:00  





  Sod 4.5 gm/ Sodium Chloride  IV  05/14/18 20:59  





  Q8HR [facility]   


 


Doxycycline Hyclate 100 mg/  100 mls @ 100 mls/hr  03/15/18 16:45  





  Dextrose  IV  05/14/18 16:44  





  Q12H [facility]   


 


Insulin Aspart  0 units  03/14/18 00:00  03/15/18 12:48





  Novolog Insulin Sliding Scale  SUBQ  05/13/18 00:00  4 units





  Q6HR WALESKA   Administration





  Protocol   


 


Insulin Detemir   units  03/14/18 11:30  





  Levemir Insulin  SUBQ  05/13/18 11:29  





  ACHS [facility]   





  Protocol   


 


Lactulose  20 gm  03/14/18 09:56  03/15/18 12:50





  Cephulac  PO  05/13/18 09:55  20 gm





  DAILY PRN   Administration





  Constipation   


 


Levothyroxine Sodium  0.125 mg  03/15/18 07:30  03/15/18 11:00





  Synthroid  PO  05/14/18 07:29  Not Given





  QDAC WALESKA   


 


Metformin HCl  1,000 mg  03/14/18 17:00  03/15/18 16:21





  Glucophage  PO  05/13/18 16:59  1,000 mg





  BID WALESKA   Administration


 


Miscellaneous  1 ea  03/15/18 05:33  





  Vancomycin Iv Per Pharmacy    05/14/18 05:32  





  PRN PRN   





  PROTOCOL   


 


Pantoprazole Sodium  40 mg  03/14/18 17:00  03/15/18 16:22





  Protonix  PO  05/13/18 16:59  40 mg





  BID WALESKA   Administration


 


Potassium Chloride  20 meq  03/15/18 09:00  03/15/18 09:21





  Klor-Con  PO  05/14/18 08:59  20 meq





  DAILY WALESKA   Administration


 


Sitagliptin Phosphate  50 mg  03/14/18 17:00  03/15/18 16:21





  Januvia  PO  05/13/18 16:59  50 mg





  BID WALESKA   Administration


 


Tramadol HCl  50 mg  03/14/18 14:00  03/15/18 14:13





  Ultram  PO  05/13/18 13:59  50 mg





  TID WALESKA   Administration








Review of Systems:


A 12 point ROS was reviewed with the pertinent positive and negatives noted in 

the HPI.


GENERAL:  The patient denies any fever at this moment.  Denies any chills.  Has


generalized weakness.


HEENT:  The patient denies any diplopia, photophobia, or sore throat.


RESPIRATORY:  The patient complains of cough and shortness of breath.


CVS:  The patient denies any chest pain or palpitation.


GASTROINTESTINAL:  The patient denies any nausea, vomiting, diarrhea, or


constipation.


GENITOURINARY:  No dysuria.


NEUROLOGIC:  No headache, no dizziness, no focal weakness.





Social History





Smoking Status                   Never smoker





Family Medical History





Family Medical History                                     Start:  03/13/18 23:

20


Freq:   ONCE                                               Status: Active      

  


 Document     03/14/18 02:23  [facility]  (Rec: 03/14/18 02:23  [facility]  AMANDA-[facility]ST

-DR1)


 Family Medical History


     Mother


      History Unknown                            Yes





Physical Exam:


 


VITAL SIGNS:  Temperature is 96.7 degrees Fahrenheit, pulse 91, respirations 17,


blood pressure 116/59.


GENERAL:  The patient is comfortable lying, in the bed, not in acute distress,


morbidly obese.


HEENT:  Head is normocephalic, atraumatic.  Oral cavity moist, pink tongue. 


Eyes:  Pallor is present, no icterus.  Pupils PERRLA, EOMI.


NECK:  Supple.  No JVD, no carotid bruit.  Trachea midline.


CHEST:  Bilateral breath sounds, decreased breath sounds.  Crackles present.


CARDIOVASCULAR:  S1, S2 within normal limits.  Regular rhythm.  No murmur, no


gallop.


ABDOMEN:  Soft, nontender, nondistended.  Bowel sounds present.


EXTREMITIES:  No cyanosis, no clubbing, no edema.


NEUROLOGICAL:  Alert, awake, oriented x 3.





LABORATORY DATA:  Current lab shows WBC count is 7300, hemoglobin 11, hematocrit


33.8, platelets are 147,000, neutrophils 77.4%.  Sodium 139, potassium 4.1,


chloride 101, bicarb is 28, BUN is 23, creatinine 1, glucose of 150.  Troponin


0.08.





IMAGING STUDIES:  Chest x-ray showed congestive changes, pneumonia of mid to


lower lung cannot be excluded.


 


Assessment: 


1.  Pneumonia.


2.  Chronic obstructive pulmonary disease exacerbation.


3.  Sleep apnea.


4.  Obesity.


5.  Congestive heart failure.


6.  History of arrhythmia.


7.  Morbid obesity.





Plan: 


continue vancomycin, resume Zosyn, and start doxycycline.











Signed,





Adan Barnard M.D.


03/15/[id number]

## 2018-03-15 NOTE — INTERNAL MEDICINE PROG NOTE
Internal Medicine Subjective





- Subjective


Service Date: 03/15/18 ([id number] [facility] [place] dictated)





Internal Medicine Objective





- Results


Result Diagrams: 


 03/15/18 06:02





 03/15/18 06:02


Recent Labs: 


 Laboratory Last Values











WBC  7.3 Th/cmm (4.8-10.8)   03/15/18  06:02    


 


RBC  3.97 Mil/cmm (3.80-5.20)   03/15/18  06:02    


 


Hgb  11.0 gm/dL (12-16)  L  03/15/18  06:02    


 


Hct  33.8 % (41.0-60)  L  03/15/18  06:02    


 


MCV  85.1 fl ()   03/15/18  06:02    


 


MCH  27.6 pg (27.0-31.0)   03/15/18  06:02    


 


MCHC Differential  32.4 pg (28.0-36.0)   03/15/18  06:02    


 


RDW  16.8 % (11.5-20.0)   03/15/18  06:02    


 


Plt Count  147 Th/cmm (150-400)  L  03/15/18  06:02    


 


MPV  10.2 fl  03/15/18  06:02    


 


Neutrophils %  77.4 % (40.0-80.0)   03/15/18  06:02    


 


Lymphocytes %  16.3 % (20.0-50.0)  L  03/15/18  06:02    


 


Monocytes %  5.7 % (2.0-10.0)   03/15/18  06:02    


 


Eosinophils %  0.5 % (0.0-5.0)   03/15/18  06:02    


 


Basophils %  0.1 % (0.0-2.0)   03/15/18  06:02    


 


Sodium  139 mEq/L (136-145)   03/15/18  06:02    


 


Potassium  4.1 mEq/L (3.5-5.1)   03/15/18  06:02    


 


Chloride  101 mEq/L ()   03/15/18  06:02    


 


Carbon Dioxide  28.1 mEq/L (21.0-31.0)   03/15/18  06:02    


 


Anion Gap  14.0  (7.0-16.0)   03/15/18  06:02    


 


BUN  23 mg/dL (7-25)   03/15/18  06:02    


 


Creatinine  1.0 mg/dL (0.6-1.2)   03/15/18  06:02    


 


Est GFR ( Amer)  TNP   03/15/18  06:02    


 


Est GFR (Non-Af Amer)  TNP   03/15/18  06:02    


 


BUN/Creatinine Ratio  23.0   03/15/18  06:02    


 


Glucose  150 mg/dL ()  H  03/15/18  06:02    


 


POC Glucose  241 MG/DL (70 - 105)  H  03/15/18  11:01    


 


Hemoglobin A1c %  7.9 % (4.0-6.0)  H  18  05:42    


 


Calcium  9.7 mg/dL (8.6-10.3)   03/15/18  06:02    


 


Total Bilirubin  0.5 mg/dL (0.3-1.0)   18  05:42    


 


AST  24 U/L (13-39)   18  05:42    


 


ALT  23 U/L (7-52)   18  05:42    


 


Alkaline Phosphatase  75 U/L ()   18  05:42    


 


Troponin I  0.08 ng/mL (0.01-0.05)  H*  03/15/18  06:02    


 


B-Natriuretic Peptide  558.0 pg/mL (5.0-100.0)  H  03/15/18  06:02    


 


Total Protein  6.9 gm/dL (6.0-8.3)   18  05:42    


 


Albumin  3.7 gm/dL (3.7-5.3)   18  05:42    


 


Globulin  3.2 gm/dL  18  05:42    


 


Albumin/Globulin Ratio  1.2  (1.0-1.8)   18  05:42    


 


Triglycerides  137 mg/dL (<150)   18  20:21    


 


Cholesterol  249 mg/dL (<200)  H  18  20:21    


 


LDL Cholesterol Direct  174 mg/dL ()   18  20:21    


 


HDL Cholesterol  48 mg/dL (23-92)   18  20:21    


 


TSH  6.80 uIU/ml (0.34-5.60)  H  18  20:21    














- Physical Exam


Vitals and I&O: 


 Vital Signs











Temp  98.7 F   03/15/18 11:57


 


Pulse  84   03/15/18 13:20


 


Resp  20   03/15/18 13:20


 


BP  115/63   03/15/18 11:57


 


Pulse Ox  97   03/15/18 13:20








 Intake & Output











 03/14/18 03/15/18 03/15/18





 18:59 06:59 18:59


 


Intake Total  550 


 


Balance  550 


 


Weight (lbs)  233 lb 


 


Intake:   


 


  Oral  550 


 


Other:   


 


  # Voids  3 


 


  # Bowel Movements  0 











Active Medications: 


Current Medications





Acetaminophen (Tylenol)  650 mg PO Q4H PRN


   PRN Reason: Pain (Mild)


   Stop: 18 08:49


   Last Admin: 03/15/18 06:52 Dose:  650 mg


Acetaminophen/Hydrocodone Bitart (Norco 10 Mg/325 Mg)  1 tab PO Q6HR PRN


   PRN Reason: Pain (Moderate)


   Stop: 18 09:55


   Last Admin: 03/15/18 12:50 Dose:  1 tab


Albuterol Sulfate (Albuterol 2.5mg/3ml Neb Ud)  2.5 mg HHN DAILYRT PRN


   PRN Reason: wheezing


   Stop: 18 09:59


Albuterol/Ipratropium (Duoneb Neb)  3 ml HHN Q6HRT [facility]


   Stop: 18 00:59


   Last Admin: 03/15/18 13:19 Dose:  3 ml


Albuterol/Ipratropium (Duoneb Neb)  3 ml HHN Q2H PRN


   PRN Reason: Wheezing


   Stop: 18 21:48


Amiodarone HCl (Cordarone)  100 mg PO DAILY [facility]


   Stop: 18 08:59


   Last Admin: 03/15/18 09:21 Dose:  100 mg


Artificial Tears (Artificial Tears Ophth Soln)  1 drop EACH EYE DAILY [facility]


   Stop: 18 08:59


   Last Admin: 03/15/18 09:28 Dose:  1 drop


Aspirin (Ecotrin)  81 mg PO DAILY [facility]


   Stop: 18 08:59


   Last Admin: 03/15/18 09:20 Dose:  81 mg


Bisacodyl (Dulcolax 5 Mg Ec Tab)  5 mg PO DAILY [facility]


   Stop: 18 08:59


   Last Admin: 03/15/18 09:21 Dose:  5 mg


Clonazepam (Klonopin)  2 mg PO HS [facility]


   Stop: 18 20:59


Docusate Sodium (Colace)  100 mg PO BID PRN


   PRN Reason: Constipation


   Stop: 18 09:55


Furosemide (Lasix)  40 mg PO DAILY [facility]


   Stop: 18 08:59


   Last Admin: 03/15/18 09:20 Dose:  40 mg


Gabapentin (Neurontin)  1,200 mg PO TID [facility]


   Stop: 18 13:59


   Last Admin: 03/15/18 09:20 Dose:  1,200 mg


Glipizide (Glucotrol)  10 mg PO BIDAC [facility]


   Stop: 18 16:59


   Last Admin: 03/15/18 08:00 Dose:  10 mg


Guaifenesin/Dextromethorphan (Robitussin Dm)  10 ml PO TID PRN


   PRN Reason: Cough


   Stop: 18 21:49


   Last Admin: 03/15/18 09:27 Dose:  10 ml


Vancomycin HCl 1 gm/ Sodium (Chloride)  250 mls @ 165 mls/hr IV Q12H [facility]


   Stop: 18 11:59


   Last Admin: 03/15/18 12:42 Dose:  165 mls/hr


Insulin Aspart (Novolog Insulin Sliding Scale)  0 units SUBQ Q6HR [facility]


   PRN Reason: Protocol


   Stop: 18 00:00


   Last Admin: 03/15/18 12:48 Dose:  4 units


Insulin Detemir (Levemir Insulin)   units SUBQ ACHS [facility]


   PRN Reason: Protocol


   Stop: 18 11:29


Lactulose (Cephulac)  20 gm PO DAILY PRN


   PRN Reason: Constipation


   Stop: 18 09:55


   Last Admin: 03/15/18 12:50 Dose:  20 gm


Levothyroxine Sodium (Synthroid)  0.125 mg PO QDAC [facility]


   Stop: 18 07:29


   Last Admin: 03/15/18 11:00 Dose:  Not Given


Metformin HCl (Glucophage)  1,000 mg PO BID [facility]


   Stop: 18 16:59


   Last Admin: 03/15/18 09:20 Dose:  1,000 mg


Miscellaneous (Vancomycin Iv Per Pharmacy)  1 ea [facility] PRN PRN


   PRN Reason: PROTOCOL


   Stop: 18 05:32


Pantoprazole Sodium (Protonix)  40 mg PO BID [facility]


   Stop: 18 16:59


   Last Admin: 03/15/18 09:21 Dose:  40 mg


Potassium Chloride (Klor-Con)  20 meq PO DAILY [facility]


   Stop: 18 08:59


   Last Admin: 03/15/18 09:21 Dose:  20 meq


Sitagliptin Phosphate (Januvia)  50 mg PO BID [facility]


   Stop: 18 16:59


   Last Admin: 03/15/18 09:20 Dose:  50 mg


Tramadol HCl (Ultram)  50 mg PO TID [facility]


   Stop: 18 13:59


   Last Admin: 03/15/18 09:20 Dose:  50 mg











- Procedures


Procedures: 


 Procedures











Procedure Code Date


 


ASSISTANCE WITH RESPIRATORY VENTILATION, <24 HRS, CPAP [id number] 18


 


POS AIRWAY PRESSURE CPAP [id number] 18














Nutritional Asmnt/Malnutr-PDOC





- Dietary Evaluation


Malnutrition Findings (Please click <Entered> for more info): 








Nutritional Asmnt/Malnutrition                             Start:  18 15:

31


Text:                                                      Status: Complete    

  


Freq:                                                                          

  


 Document     18 15:31  LCHENG  (Rec: 18 15:43  LCHENG  [id number])


 Nutritional Asmnt/Malnutrition


     Patient General Information


      Nutritional Screening                      High Risk


      Diagnosis                                  PNA


      Pertinent Medical Hx/Surgical Hx           no H&P, not able to obtain


      Subjective Information                      at admitting noted. pt


                                                 seen lying in bed with lunch


                                                 tray in her front. Pt is


                                                 Greek speaking. Per RN, pt


                                                 consumed <25% of lunch and


                                                 refused to eat, complaining


                                                 stomach pain. PO intake 50% of


                                                 breakfast this morning. Pt is


                                                 on insulin noted.


      Current Diet Order/ Nutrition Support      low sodium 2gm


      Pertinent Medications                      lasix, novolog, levemir,


                                                 synthroid, glucophage,


                                                 protonix, kcal


      Pertinent Labs                             3/14 Na 135, Glucose 302, POC


                                                 277-346


                                                 3/13 


     Nutritional Hx/Data


      Height                                     4 ft 10 in


      Height (Calculated Centimeters)            147.3


      Current Weight (lbs)                       233 lb


      Weight (Calculated Kilograms)              105.7


      Weight (Calculated Grams)                  321198.0


      Ideal Body Weight                          96


      Body Mass Index (BMI)                      48.6


      Weight Status                              Obese


     GI Symptoms


      GI Symptoms                                None


      Last BM                                    no record


      Difficult in:                              None


      Skin Integrity/Comment:                    intact


      Current %PO                                Poor (25-49%)


     Estimated Nutritional Goals


      BEE in Kcals:                              Adj wt of IBW


      Calories/Kcals/Kg                          30-35


      Kcals Calculated                           6842-3712


      Protein:                                   Adj wt of IBW


      Protein g/k-1.2


      Protein Calculated                         59-71


      Fluid: ml                                  1770-206ml (1ml/kcal)


     Nutritional Problem


      1. Problem


       Problem                                   altered nutrition related labs


       Etiology                                  endocrine dysfunction


       Signs/Symptoms:                           Glucose 302, -346


     Malnutrition Alert


      Protein-Calorie Malnutrition               N/A


      Is there a minimum of two criteria         No


       selected?                                 


       Query Text:Check all the applicable       


       criteria. A minimum of two criteria are   


       recommended for diagnosis of either       


       severe or non-severe malnutrition.        


     Intervention/Recommendation


      Comments                                   1. Recommend adding CCHO-60gm


                                                 diet d/t hyperglycemia and on


                                                 insulin. RN notified, will


                                                 talk to MD.


                                                 2. Monitor PO intake, wt, labs


                                                 and skin integrity


                                                 3. F/U as high risk in 2-3


                                                 days, 3/16-3/17


     Expected Outcomes/Goals


      Expected Outcomes/Goals                    1. PO intake to meet at least


                                                 75% of nutritional needs.


                                                 2. Wt stability, skin to


                                                 remain intact, labs to


                                                 approach WNL.

## 2018-03-16 LAB
ANION GAP SERPL CALC-SCNC: 10.4 MMOL/L (ref 7–16)
BUN SERPL-MCNC: 18 MG/DL (ref 7–25)
CALCIUM SERPL-MCNC: 8.9 MG/DL (ref 8.6–10.3)
CHLORIDE SERPL-SCNC: 102 MEQ/L (ref 98–107)
CO2 SERPL-SCNC: 31.5 MEQ/L (ref 21–31)
CREAT SERPL-MCNC: 1 MG/DL (ref 0.6–1.2)
GLUCOSE SERPL-MCNC: 125 MG/DL (ref 70–105)
POTASSIUM SERPL-SCNC: 3.9 MEQ/L (ref 3.5–5.1)
SODIUM SERPL-SCNC: 140 MEQ/L (ref 136–145)

## 2018-03-16 RX ADMIN — POTASSIUM CHLORIDE SCH MEQ: 20 TABLET, EXTENDED RELEASE ORAL at 09:48

## 2018-03-16 RX ADMIN — DEXTROMETHORPHAN HBR AND GUAIFENESIN PRN ML: 10; 100 SOLUTION ORAL at 22:14

## 2018-03-16 RX ADMIN — INSULIN ASPART SCH: 100 INJECTION, SOLUTION INTRAVENOUS; SUBCUTANEOUS at 07:30

## 2018-03-16 RX ADMIN — IPRATROPIUM BROMIDE AND ALBUTEROL SULFATE SCH ML: .5; 3 SOLUTION RESPIRATORY (INHALATION) at 20:24

## 2018-03-16 RX ADMIN — LEVOTHYROXINE SODIUM SCH: 125 TABLET ORAL at 10:03

## 2018-03-16 RX ADMIN — IPRATROPIUM BROMIDE AND ALBUTEROL SULFATE SCH ML: .5; 3 SOLUTION RESPIRATORY (INHALATION) at 07:49

## 2018-03-16 RX ADMIN — INSULIN ASPART SCH UNITS: 100 INJECTION, SOLUTION INTRAVENOUS; SUBCUTANEOUS at 00:18

## 2018-03-16 RX ADMIN — INSULIN ASPART SCH UNITS: 100 INJECTION, SOLUTION INTRAVENOUS; SUBCUTANEOUS at 17:55

## 2018-03-16 RX ADMIN — POLYVINYL ALCOHOL SCH DROP: 14 SOLUTION/ DROPS OPHTHALMIC at 09:50

## 2018-03-16 RX ADMIN — PANTOPRAZOLE SODIUM SCH MG: 40 TABLET, DELAYED RELEASE ORAL at 17:39

## 2018-03-16 RX ADMIN — DEXTROMETHORPHAN HBR AND GUAIFENESIN PRN ML: 10; 100 SOLUTION ORAL at 07:16

## 2018-03-16 RX ADMIN — INSULIN ASPART SCH UNITS: 100 INJECTION, SOLUTION INTRAVENOUS; SUBCUTANEOUS at 12:01

## 2018-03-16 RX ADMIN — IPRATROPIUM BROMIDE AND ALBUTEROL SULFATE SCH ML: .5; 3 SOLUTION RESPIRATORY (INHALATION) at 12:40

## 2018-03-16 RX ADMIN — IPRATROPIUM BROMIDE AND ALBUTEROL SULFATE SCH ML: .5; 3 SOLUTION RESPIRATORY (INHALATION) at 02:19

## 2018-03-16 RX ADMIN — LACTULOSE PRN GM: 20 SOLUTION ORAL at 10:03

## 2018-03-16 RX ADMIN — PANTOPRAZOLE SODIUM SCH MG: 40 TABLET, DELAYED RELEASE ORAL at 09:48

## 2018-03-16 NOTE — DIAGNOSTIC IMAGING REPORT
Portable chest x-ray



HISTORY: Pneumonia



Compared with prior exam of March 14, 2018, the heart is enlarged. 

Patient is rotated.  There appears to be increased density in the left

lower hemithorax.  Findings may be associated pleural fluid.  Underlying

consolidation and/or atelectasis cannot be excluded.



IMPRESSION:

1.  Increased density in the left lower hemithorax and may be associated

with pleural fluid.  Underlying pneumonia and/or atelectasis cannot be

excluded.

2.  Persistent cardiomegaly with atherosclerotic vascular changes

## 2018-03-16 NOTE — CONSULTATION
DATE OF CONSULTATION:     03/14/2018



REFERRING PHYSICIAN:  Dr. Urena.



REASON FOR CONSULTATION:  Pneumonia.



HISTORY OF PRESENT ILLNESS:  The patient is a 77-year-old female with a past

medical history of obstructive sleep apnea, diabetes mellitus type 2, CHF,

cardiomyopathy, coronary artery disease, hypertension, history of lumbar

radiculopathy, and history of cardiac arrhythmias, went to [facility] ER

for cough, fever, and shortness of breath.  The patient was diagnosed to have

pneumonia and COPD exacerbation.  The patient was transferred to [facility] for further care.  The patient still complains of shortness

of breath with improvement in her cough.  Antibiotic wise, the patient was

already started on vancomycin and Zosyn.  Chest x-ray showed congestive changes,

pneumonia of mid to lower lung.



ALLERGIES:  NKDA.



MEDICATIONS:  Per medication reconciliation sheet.  Antibiotic wise, the patient

has received vancomycin and Zosyn.



SOCIAL HISTORY:  The patient lives at home.  Denies any smoking, alcohol, or

drug use.



REVIEW OF SYSTEMS:

GENERAL:  The patient denies any fever at this moment.  Denies any chills.  Has

generalized weakness.

HEENT:  The patient denies any diplopia, photophobia, or sore throat.

RESPIRATORY:  The patient complains of cough and shortness of breath.

CVS:  The patient denies any chest pain or palpitation.

GASTROINTESTINAL:  The patient denies any nausea, vomiting, diarrhea, or

constipation.

GENITOURINARY:  No dysuria.

NEUROLOGIC:  No headache, no dizziness, no focal weakness.



PHYSICAL EXAMINATION:

VITAL SIGNS:  Temperature is 96.7 degrees Fahrenheit, pulse 91, respirations 17,

blood pressure 116/59.

GENERAL:  The patient is comfortable lying, in the bed, not in acute distress,

morbidly obese.

HEENT:  Head is normocephalic, atraumatic.  Oral cavity moist, pink tongue. 

Eyes:  Pallor is present, no icterus.  Pupils PERRLA, EOMI.

NECK:  Supple.  No JVD, no carotid bruit.  Trachea midline.

CHEST:  Bilateral breath sounds, decreased breath sounds.  Crackles present.

CARDIOVASCULAR:  S1, S2 within normal limits.  Regular rhythm.  No murmur, no

gallop.

ABDOMEN:  Soft, nontender, nondistended.  Bowel sounds present.

EXTREMITIES:  No cyanosis, no clubbing, no edema.

NEUROLOGICAL:  Alert, awake, oriented x 3.



LABORATORY DATA:  Current lab shows WBC count is 7300, hemoglobin 11, hematocrit

33.8, platelets are 147,000, neutrophils 77.4%.  Sodium 139, potassium 4.1,

chloride 101, bicarb is 28, BUN is 23, creatinine 1, glucose of 150.  Troponin

0.08.



IMAGING STUDIES:  Chest x-ray showed congestive changes, pneumonia of mid to

lower lung cannot be excluded.



IMPRESSION:

1.  Pneumonia.

2.  Chronic obstructive pulmonary disease exacerbation.

3.  Sleep apnea.

4.  Obesity.

5.  Congestive heart failure.

6.  History of arrhythmia.

7.  Morbid obesity.



RECOMMENDATIONS:  We will continue vancomycin, resume Zosyn, and start

doxycycline.





DD: 03/15/2018 16:46

DT: 03/16/2018 04:27

JOB# [id number]  [id number]

[facility] PartnersMAURICE

## 2018-03-16 NOTE — CARDIOLOGY
03/15/2018



Patient of Dr. Urena.



M-MODE ECHOCARDIOGRAM:  Mitral valve:  Anterior leaflet of mitral valve shows

decreased excursion, EF velocity.  Posterior leaflet of the mitral valve shows

decreased excursion.  Left ventricular posterior shows increased thickness,

decreased excursion.  Interventricular septum shows increased thickness,

decreased excursion.  Hypertrophy of the left ventricle, ejection fraction 45%. 

Left atrium enlarged 4.6 cm.  Aortic root shows normal dimension, normal

excursion of aortic leaflets.



CONCLUSION:  Hypertrophy of the left ventricle, cardiomyopathy, ejection

fraction 45%, left atrial enlargement.



2D ECHO:  Long axis view showed enlarged left ventricular cavity with decreased

ejection fraction.  Hypertrophy of the left ventricle.  Left atrial enlargement,

4.6 cm.  Aortic root shows normal dimension, normal excursion of aortic

leaflets.  Short axis view of mitral valve normal.  Short axis view of aortic

valve normal.  Apical four chamber view shows hypertrophy of the left ventricle,

ejection fraction 45%.  Left atrium enlarged.  Right ventricular cavity is

enlarged.  Right atrium enlarged.



CONCLUSION:  Left atrial enlargement, right atrial enlargement, right ventricle

enlargement, cardiomyopathy, ejection fraction 45%, hypertrophy of the left

ventricle.



Doppler study shows moderate mitral regurgitation, moderate tricuspid

regurgitation, right ventricular systolic pressure of 57 mmHg with moderate

pulmonary hypertension.





DD: 03/16/2018 14:24

DT: 03/16/2018 14:39

JOB# [id number]  [id number]

## 2018-03-17 RX ADMIN — DEXTROMETHORPHAN HBR AND GUAIFENESIN PRN ML: 10; 100 SOLUTION ORAL at 06:47

## 2018-03-17 RX ADMIN — POTASSIUM CHLORIDE SCH MEQ: 20 TABLET, EXTENDED RELEASE ORAL at 09:03

## 2018-03-17 RX ADMIN — Medication SCH EACH: at 09:01

## 2018-03-17 RX ADMIN — IPRATROPIUM BROMIDE AND ALBUTEROL SULFATE SCH ML: .5; 3 SOLUTION RESPIRATORY (INHALATION) at 13:31

## 2018-03-17 RX ADMIN — DEXTROMETHORPHAN HBR AND GUAIFENESIN PRN ML: 10; 100 SOLUTION ORAL at 21:24

## 2018-03-17 RX ADMIN — PANTOPRAZOLE SODIUM SCH MG: 40 TABLET, DELAYED RELEASE ORAL at 09:03

## 2018-03-17 RX ADMIN — INSULIN ASPART SCH UNITS: 100 INJECTION, SOLUTION INTRAVENOUS; SUBCUTANEOUS at 12:38

## 2018-03-17 RX ADMIN — POLYVINYL ALCOHOL SCH DROP: 14 SOLUTION/ DROPS OPHTHALMIC at 09:03

## 2018-03-17 RX ADMIN — IPRATROPIUM BROMIDE AND ALBUTEROL SULFATE SCH ML: .5; 3 SOLUTION RESPIRATORY (INHALATION) at 18:50

## 2018-03-17 RX ADMIN — IPRATROPIUM BROMIDE AND ALBUTEROL SULFATE SCH ML: .5; 3 SOLUTION RESPIRATORY (INHALATION) at 07:03

## 2018-03-17 RX ADMIN — IPRATROPIUM BROMIDE AND ALBUTEROL SULFATE SCH ML: .5; 3 SOLUTION RESPIRATORY (INHALATION) at 01:13

## 2018-03-17 RX ADMIN — INSULIN ASPART SCH: 100 INJECTION, SOLUTION INTRAVENOUS; SUBCUTANEOUS at 02:44

## 2018-03-17 RX ADMIN — INSULIN ASPART SCH: 100 INJECTION, SOLUTION INTRAVENOUS; SUBCUTANEOUS at 06:47

## 2018-03-17 RX ADMIN — INSULIN ASPART SCH: 100 INJECTION, SOLUTION INTRAVENOUS; SUBCUTANEOUS at 17:25

## 2018-03-17 RX ADMIN — PANTOPRAZOLE SODIUM SCH MG: 40 TABLET, DELAYED RELEASE ORAL at 16:29

## 2018-03-17 NOTE — PROGRESS NOTES
DATE:  03/17/2018



SUBJECTIVE:  The patient was seen in her room asleep, but easily arousable. 

Otherwise, the patient appears to be comfortable, in no acute distress.



OBJECTIVE:

VITAL SIGNS:  Temperature 97.6, heart rate 77, respirations 18, blood pressure

109/59, 97% on room air.

HEENT:  Head is atraumatic and normocephalic.  Eyes:  Bilateral conjunctivae are

clear.  Bilateral pupils are equally round and reactive.

NECK:  Supple.  No JVD.

CARDIOVASCULAR:  S1 and S2, without murmur.

PULMONARY:  Decreased breath sounds.

GASTROINTESTINAL:  Soft and nontender without guarding.  Positive bowel sounds.

MUSCULOSKELETAL:  No clubbing.  No cyanosis noted.



ASSESSMENT:

1.  Pneumonia.

2.  Chronic obstructive pulmonary disease.

3.  Obstructive sleep apnea.

4.  Congestive heart failure.

5.  Morbid obesity.



PLAN:  We will continue current IV antibiotics.  We will put the patient on

BiPAP as needed and to monitor the patient's intake and output.  Treatment plans

were discussed with the patient's nurse.  Treatment plans were discussed with

Dr. Urena.





DD: 03/17/2018 10:16

DT: 03/17/2018 16:24

JOB# [id number]  [id number]

## 2018-03-18 RX ADMIN — IPRATROPIUM BROMIDE AND ALBUTEROL SULFATE SCH ML: .5; 3 SOLUTION RESPIRATORY (INHALATION) at 18:53

## 2018-03-18 RX ADMIN — POTASSIUM CHLORIDE SCH MEQ: 20 TABLET, EXTENDED RELEASE ORAL at 08:48

## 2018-03-18 RX ADMIN — Medication SCH EACH: at 08:49

## 2018-03-18 RX ADMIN — IPRATROPIUM BROMIDE AND ALBUTEROL SULFATE SCH ML: .5; 3 SOLUTION RESPIRATORY (INHALATION) at 12:12

## 2018-03-18 RX ADMIN — POLYVINYL ALCOHOL SCH DROP: 14 SOLUTION/ DROPS OPHTHALMIC at 08:49

## 2018-03-18 RX ADMIN — IPRATROPIUM BROMIDE AND ALBUTEROL SULFATE SCH ML: .5; 3 SOLUTION RESPIRATORY (INHALATION) at 06:56

## 2018-03-18 RX ADMIN — INSULIN ASPART SCH: 100 INJECTION, SOLUTION INTRAVENOUS; SUBCUTANEOUS at 11:27

## 2018-03-18 RX ADMIN — INSULIN ASPART SCH: 100 INJECTION, SOLUTION INTRAVENOUS; SUBCUTANEOUS at 06:21

## 2018-03-18 RX ADMIN — PANTOPRAZOLE SODIUM SCH MG: 40 TABLET, DELAYED RELEASE ORAL at 16:54

## 2018-03-18 RX ADMIN — INSULIN ASPART SCH: 100 INJECTION, SOLUTION INTRAVENOUS; SUBCUTANEOUS at 17:03

## 2018-03-18 RX ADMIN — INSULIN ASPART SCH: 100 INJECTION, SOLUTION INTRAVENOUS; SUBCUTANEOUS at 00:25

## 2018-03-18 RX ADMIN — IPRATROPIUM BROMIDE AND ALBUTEROL SULFATE SCH ML: .5; 3 SOLUTION RESPIRATORY (INHALATION) at 00:33

## 2018-03-18 RX ADMIN — PANTOPRAZOLE SODIUM SCH MG: 40 TABLET, DELAYED RELEASE ORAL at 08:48

## 2018-03-18 RX ADMIN — DEXTROMETHORPHAN HBR AND GUAIFENESIN PRN ML: 10; 100 SOLUTION ORAL at 21:25

## 2018-03-18 NOTE — INFECTIOUS DISEASE PROG NOTE
Infectious Disease Subjective





- Review of Systems


Service Date: 18


Subjective: 





There is no new change, no fever.  Still  short of breath +





Infectious Disease Objective





- Results


Result Diagrams: 


 03/15/18 06:02





 18 05:30


Recent Labs: 


 Laboratory Last Values











WBC  7.3 Th/cmm (4.8-10.8)   03/15/18  06:02    


 


RBC  3.97 Mil/cmm (3.80-5.20)   03/15/18  06:02    


 


Hgb  11.0 gm/dL (12-16)  L  03/15/18  06:02    


 


Hct  33.8 % (41.0-60)  L  03/15/18  06:02    


 


MCV  85.1 fl ()   03/15/18  06:02    


 


MCH  27.6 pg (27.0-31.0)   03/15/18  06:02    


 


MCHC Differential  32.4 pg (28.0-36.0)   03/15/18  06:02    


 


RDW  16.8 % (11.5-20.0)   03/15/18  06:02    


 


Plt Count  147 Th/cmm (150-400)  L  03/15/18  06:02    


 


MPV  10.2 fl  03/15/18  06:02    


 


Neutrophils %  77.4 % (40.0-80.0)   03/15/18  06:02    


 


Lymphocytes %  16.3 % (20.0-50.0)  L  03/15/18  06:02    


 


Monocytes %  5.7 % (2.0-10.0)   03/15/18  06:02    


 


Eosinophils %  0.5 % (0.0-5.0)   03/15/18  06:02    


 


Basophils %  0.1 % (0.0-2.0)   03/15/18  06:02    


 


Sodium  140 mEq/L (136-145)   18  05:30    


 


Potassium  3.9 mEq/L (3.5-5.1)   18  05:30    


 


Chloride  102 mEq/L ()   18  05:30    


 


Carbon Dioxide  31.5 mEq/L (21.0-31.0)  H  18  05:30    


 


Anion Gap  10.4  (7.0-16.0)   18  05:30    


 


BUN  18 mg/dL (7-25)   18  05:30    


 


Creatinine  1.0 mg/dL (0.6-1.2)   18  05:30    


 


Est GFR ( Amer)  TNP   18  05:30    


 


Est GFR (Non-Af Amer)  TNP   18  05:30    


 


BUN/Creatinine Ratio  18.0   18  05:30    


 


Glucose  125 mg/dL ()  H  18  05:30    


 


POC Glucose  123 MG/DL (70 - 105)  H  18  11:26    


 


Hemoglobin A1c %  7.9 % (4.0-6.0)  H  18  05:42    


 


Calcium  8.9 mg/dL (8.6-10.3)   18  05:30    


 


Total Bilirubin  0.5 mg/dL (0.3-1.0)   18  05:42    


 


AST  24 U/L (13-39)   18  05:42    


 


ALT  23 U/L (7-52)   18  05:42    


 


Alkaline Phosphatase  75 U/L ()   18  05:42    


 


Troponin I  0.08 ng/mL (0.01-0.05)  H*  03/15/18  06:02    


 


B-Natriuretic Peptide  558.0 pg/mL (5.0-100.0)  H  03/15/18  06:02    


 


Total Protein  6.9 gm/dL (6.0-8.3)   18  05:42    


 


Albumin  3.7 gm/dL (3.7-5.3)   18  05:42    


 


Globulin  3.2 gm/dL  18  05:42    


 


Albumin/Globulin Ratio  1.2  (1.0-1.8)   18  05:42    


 


Triglycerides  137 mg/dL (<150)   18  20:21    


 


Cholesterol  249 mg/dL (<200)  H  18  20:21    


 


LDL Cholesterol Direct  174 mg/dL ()   18  20:21    


 


HDL Cholesterol  48 mg/dL (23-92)   18  20:21    


 


TSH  6.80 uIU/ml (0.34-5.60)  H  18  20:21    


 


Vancomycin Trough  23.4 ug/mL (10-20)  H  18  11:10    














- Physical Exam


Vitals and I&O: 


 Vital Signs











Temp  98.2 F   18 12:01


 


Pulse  77   18 12:12


 


Resp  18   18 12:12


 


BP  134/72   18 12:01


 


Pulse Ox  96   18 12:12








 Intake & Output











 18





 18:59 06:59 18:59


 


Intake Total 1550 550 


 


Output Total 2  


 


Balance 1548 550 


 


Weight (lbs) 105.687 kg 0 g 


 


Intake:   


 


  Intake, IV Amount 550 550 


 


    Doxycycline Hyclate 100 100 100 





    mg In Dextrose 5% 100 ml   





    @ 100 mls/hr IV Q12H [facility]   





    Rx#:[id number]   


 


    Piperacillin Sodium/ 200 200 





    Tazobact 4.5 gm In Sodium   





    Chloride 0.9% 100 ml @   





    100 mls/hr IV Q8HR [facility] Rx   





    #:[id number]   


 


    Vancomycin HCl 1 gm In 250 250 





    Sodium Chloride 0.9% 250   





    ml @ 165 mls/hr IV Q12H   





    [facility] Rx#:[id number]   


 


  Oral 1000  


 


Output:   


 


  Stool 2  


 


Other:   


 


  # Voids 4  











Active Medications: 


Current Medications





Acetaminophen (Tylenol)  650 mg PO Q4H PRN


   PRN Reason: Pain (Mild)


   Stop: 18 08:49


   Last Admin: 03/15/18 06:52 Dose:  650 mg


Acetaminophen/Hydrocodone Bitart (Norco 10 Mg/325 Mg)  1 tab PO Q6HR PRN


   PRN Reason: Pain (Moderate)


   Stop: 18 09:55


   Last Admin: 03/15/18 12:50 Dose:  1 tab


Albuterol/Ipratropium (Duoneb Neb)  3 ml HHN Q6HRT [facility]


   Stop: 18 00:59


   Last Admin: 18 12:12 Dose:  3 ml


Albuterol/Ipratropium (Duoneb Neb)  3 ml HHN Q2H PRN


   PRN Reason: Wheezing


   Stop: 18 21:48


   Last Admin: 18 05:19 Dose:  3 ml


Amiodarone HCl (Cordarone)  100 mg PO DAILY [facility]


   Stop: 18 08:59


   Last Admin: 18 08:48 Dose:  100 mg


Artificial Tears (Artificial Tears Ophth Soln)  1 drop EACH EYE DAILY [facility]


   Stop: 18 08:59


   Last Admin: 18 08:49 Dose:  1 drop


Aspirin (Ecotrin)  81 mg PO DAILY [facility]


   Stop: 18 08:59


   Last Admin: 18 08:48 Dose:  81 mg


Bisacodyl (Dulcolax 5 Mg Ec Tab)  5 mg PO DAILY [facility]


   Stop: 18 08:59


   Last Admin: 18 08:48 Dose:  5 mg


Bisacodyl (Dulcolax 10 Mg Supp)  10 mg RC DAILY PRN


   PRN Reason: Constipation


   Stop: 05/15/18 13:02


   Last Admin: 18 15:47 Dose:  10 mg


Clonazepam (Klonopin)  2 mg PO HS [facility]


   Stop: 18 20:59


   Last Admin: 18 21:24 Dose:  2 mg


Docusate Sodium (Colace)  100 mg PO BID PRN


   PRN Reason: Constipation


   Stop: 18 09:55


Furosemide (Lasix)  40 mg PO DAILY [facility]


   Stop: 18 08:59


   Last Admin: 18 08:49 Dose:  40 mg


Gabapentin (Neurontin)  1,200 mg PO TID [facility]


   Stop: 18 13:59


   Last Admin: 18 13:02 Dose:  1,200 mg


Glipizide (Glucotrol)  10 mg PO BIDAC [facility]


   Stop: 18 16:59


   Last Admin: 18 06:55 Dose:  Not Given


Guaifenesin/Dextromethorphan (Robitussin Dm)  10 ml PO TID PRN


   PRN Reason: Cough


   Stop: 18 21:49


   Last Admin: 18 21:24 Dose:  10 ml


Piperacillin Sod/Tazobactam (Sod 4.5 gm/ Sodium Chloride)  100 mls @ 100 mls/hr 

IV Q8HR [facility]


   Stop: 18 20:59


   Last Admin: 18 12:54 Dose:  100 mls/hr


Doxycycline Hyclate 100 mg/ (Dextrose)  100 mls @ 100 mls/hr IV Q12H [facility]


   Stop: 18 22:29


   Last Admin: 18 10:13 Dose:  100 mls/hr


Vancomycin HCl 1.5 gm/ Sodium (Chloride)  500 mls @ 250 mls/hr IV Q24H [facility]


   Stop: 18 13:59


   Last Admin: 18 13:03 Dose:  250 mls/hr


Insulin Aspart (Novolog Insulin Sliding Scale)  0 units SUBQ Q6HR [facility]


   PRN Reason: Protocol


   Stop: 18 00:00


   Last Admin: 18 11:27 Dose:  Not Given


Lactobacillus Rhamnosus (Culturelle 15b)  1 each PO DAILY [facility]


   Stop: 18 08:59


   Last Admin: 18 08:49 Dose:  1 each


Lactulose (Cephulac)  20 gm PO DAILY PRN


   PRN Reason: Constipation


   Stop: 18 09:55


   Last Admin: 18 10:03 Dose:  20 gm


Levothyroxine Sodium 0.025 mg/ (Levothyroxine Sodium 0.1 mg)  0.125 mg PO QDAC 

[facility]


   Stop: 05/15/18 11:59


   Last Admin: 18 06:54 Dose:  0.125 mg


Metformin HCl (Glucophage)  1,000 mg PO BIDWM [facility]


   Stop: 18 16:59


   Last Admin: 18 08:47 Dose:  1,000 mg


Miscellaneous (Vancomycin Iv Per Pharmacy)  1 ea  PRN PRN


   PRN Reason: PROTOCOL


   Stop: 18 05:32


Miscellaneous (Probiotic Screen)  1 ea  PRN PRN


   PRN Reason: PROTOCOL


   Stop: 05/15/18 14:54


Pantoprazole Sodium (Protonix)  40 mg PO BID [facility]


   Stop: 18 16:59


   Last Admin: 18 08:48 Dose:  40 mg


Potassium Chloride (Klor-Con)  20 meq PO DAILY [facility]


   Stop: 18 08:59


   Last Admin: 18 08:48 Dose:  20 meq


Sitagliptin Phosphate (Januvia)  50 mg PO BID [facility]


   Stop: 18 16:59


   Last Admin: 18 08:47 Dose:  50 mg


Tramadol HCl (Ultram)  50 mg PO TID [facility]


   Stop: 18 13:59


   Last Admin: 18 13:02 Dose:  50 mg








General: no acute distress, well developed, well nourished


HEENT: atraumatic, normocephalic, PERRLA, EOMI


Neck: supple, no thyromegaly


Cardiovascular: S1S2, regular


Lungs: clear to auscultation bilaterally, clear to percussion


Abdomen: soft, no tender, no distended


Extremities: no cyanosis, no clubbing, no edema


Neurological: awake, alert, oriented


Skin: intact





- Procedures


Procedures: 


 Procedures











Procedure Code Date


 


ASSISTANCE WITH RESPIRATORY VENTILATION, <24 HRS, CPAP [id number] 18


 


POS AIRWAY PRESSURE CPAP [id number] 18














Infectious Disease Assmt/Plan





- Assessment


Assessment: 


1.  Pneumonia.


2.  Chronic obstructive pulmonary disease exacerbation.


3.  Sleep apnea.


4.  Obesity.


5.  Congestive heart failure.


6.  History of arrhythmia.


7.  Morbid obesity.








- Plan


Plan: 


continue vancomycin, Zosyn and doxycycline.


Will get CT of Chest.  If it can be done may change made to ultrasound the 

chest on left side.








Nutritional Asmnt/Malnutr-PDOC





- Dietary Evaluation


Malnutrition Findings (Please click <Entered> for more info): 








Nutritional Asmnt/Malnutrition                             Start:  18 15:

31


Text:                                                      Status: Complete    

  


Freq:                                                                          

  


 Document     18 15:31  LCHENG  (Rec: 18 15:43  LCHENG  [id number])


 Nutritional Asmnt/Malnutrition


     Patient General Information


      Nutritional Screening                      High Risk


      Diagnosis                                  PNA


      Pertinent Medical Hx/Surgical Hx           no H&P, not able to obtain


      Subjective Information                      at admitting noted. pt


                                                 seen lying in bed with lunch


                                                 tray in her front. Pt is


                                                 American speaking. Per RN, pt


                                                 consumed <25% of lunch and


                                                 refused to eat, complaining


                                                 stomach pain. PO intake 50% of


                                                 breakfast this morning. Pt is


                                                 on insulin noted.


      Current Diet Order/ Nutrition Support      low sodium 2gm


      Pertinent Medications                      lasix, novolog, levemir,


                                                 synthroid, glucophage,


                                                 protonix, kcal


      Pertinent Labs                             3/14 Na 135, Glucose 302, POC


                                                 277-346


                                                 3/13 


     Nutritional Hx/Data


      Height                                     1.47 m


      Height (Calculated Centimeters)            147.3


      Current Weight (lbs)                       105.687 kg


      Weight (Calculated Kilograms)              105.7


      Weight (Calculated Grams)                  336106.0


      Ideal Body Weight                          96


      Body Mass Index (BMI)                      48.6


      Weight Status                              Obese


     GI Symptoms


      GI Symptoms                                None


      Last BM                                    no record


      Difficult in:                              None


      Skin Integrity/Comment:                    intact


      Current %PO                                Poor (25-49%)


     Estimated Nutritional Goals


      BEE in Kcals:                              Adj wt of IBW


      Calories/Kcals/Kg                          30-35


      Kcals Calculated                           7962-5085


      Protein:                                   Adj wt of IBW


      Protein g/k-1.2


      Protein Calculated                         59-71


      Fluid: ml                                  1770-2065ml (1ml/kcal)


     Nutritional Problem


      1. Problem


       Problem                                   altered nutrition related labs


       Etiology                                  endocrine dysfunction


       Signs/Symptoms:                           Glucose 302, -346


     Malnutrition Alert


      Protein-Calorie Malnutrition               N/A


      Is there a minimum of two criteria         No


       selected?                                 


       Query Text:Check all the applicable       


       criteria. A minimum of two criteria are   


       recommended for diagnosis of either       


       severe or non-severe malnutrition.        


     Intervention/Recommendation


      Comments                                   1. Recommend adding CCHO-60gm


                                                 diet d/t hyperglycemia and on


                                                 insulin. RN notified, will


                                                 talk to MD.


                                                 2. Monitor PO intake, wt, labs


                                                 and skin integrity


                                                 3. F/U as high risk in 2-3


                                                 days, 3/16-3/17


     Expected Outcomes/Goals


      Expected Outcomes/Goals                    1. PO intake to meet at least


                                                 75% of nutritional needs.


                                                 2. Wt stability, skin to


                                                 remain intact, labs to


                                                 approach WNL.

## 2018-03-18 NOTE — GENERAL PROGRESS NOTE
Subjective





- Review of Systems


Events since last encounter: 





patient comfortable 


in no distress 





Objective





- Results


Result Diagrams: 


 03/15/18 06:02





 18 05:30


Recent Labs: 


 Laboratory Last Values











WBC  7.3 Th/cmm (4.8-10.8)   03/15/18  06:02    


 


RBC  3.97 Mil/cmm (3.80-5.20)   03/15/18  06:02    


 


Hgb  11.0 gm/dL (12-16)  L  03/15/18  06:02    


 


Hct  33.8 % (41.0-60)  L  03/15/18  06:02    


 


MCV  85.1 fl ()   03/15/18  06:02    


 


MCH  27.6 pg (27.0-31.0)   03/15/18  06:02    


 


MCHC Differential  32.4 pg (28.0-36.0)   03/15/18  06:02    


 


RDW  16.8 % (11.5-20.0)   03/15/18  06:02    


 


Plt Count  147 Th/cmm (150-400)  L  03/15/18  06:02    


 


MPV  10.2 fl  03/15/18  06:02    


 


Neutrophils %  77.4 % (40.0-80.0)   03/15/18  06:02    


 


Lymphocytes %  16.3 % (20.0-50.0)  L  03/15/18  06:02    


 


Monocytes %  5.7 % (2.0-10.0)   03/15/18  06:02    


 


Eosinophils %  0.5 % (0.0-5.0)   03/15/18  06:02    


 


Basophils %  0.1 % (0.0-2.0)   03/15/18  06:02    


 


Sodium  140 mEq/L (136-145)   18  05:30    


 


Potassium  3.9 mEq/L (3.5-5.1)   18  05:30    


 


Chloride  102 mEq/L ()   18  05:30    


 


Carbon Dioxide  31.5 mEq/L (21.0-31.0)  H  18  05:30    


 


Anion Gap  10.4  (7.0-16.0)   18  05:30    


 


BUN  18 mg/dL (7-25)   18  05:30    


 


Creatinine  1.0 mg/dL (0.6-1.2)   18  05:30    


 


Est GFR ( Amer)  TNP   18  05:30    


 


Est GFR (Non-Af Amer)  TNP   18  05:30    


 


BUN/Creatinine Ratio  18.0   18  05:30    


 


Glucose  125 mg/dL ()  H  18  05:30    


 


POC Glucose  74 MG/DL (70 - 105)   18  06:20    


 


Hemoglobin A1c %  7.9 % (4.0-6.0)  H  18  05:42    


 


Calcium  8.9 mg/dL (8.6-10.3)   18  05:30    


 


Total Bilirubin  0.5 mg/dL (0.3-1.0)   18  05:42    


 


AST  24 U/L (13-39)   18  05:42    


 


ALT  23 U/L (7-52)   18  05:42    


 


Alkaline Phosphatase  75 U/L ()   18  05:42    


 


Troponin I  0.08 ng/mL (0.01-0.05)  H*  03/15/18  06:02    


 


B-Natriuretic Peptide  558.0 pg/mL (5.0-100.0)  H  03/15/18  06:02    


 


Total Protein  6.9 gm/dL (6.0-8.3)   18  05:42    


 


Albumin  3.7 gm/dL (3.7-5.3)   18  05:42    


 


Globulin  3.2 gm/dL  18  05:42    


 


Albumin/Globulin Ratio  1.2  (1.0-1.8)   18  05:42    


 


Triglycerides  137 mg/dL (<150)   18  20:21    


 


Cholesterol  249 mg/dL (<200)  H  18  20:21    


 


LDL Cholesterol Direct  174 mg/dL ()   18  20:21    


 


HDL Cholesterol  48 mg/dL (23-92)   18  20:21    


 


TSH  6.80 uIU/ml (0.34-5.60)  H  18  20:21    


 


Vancomycin Trough  13.6 ug/mL (10-20)   18  11:27    














- Physical Exam


Vitals and I&O: 


 Vital Signs











Temp  97.9 F   18 07:52


 


Pulse  77   18 08:48


 


Resp  19   18 07:52


 


BP  136/68   18 08:49


 


Pulse Ox  98   18 07:52








 Intake & Output











 18





 18:59 06:59 18:59


 


Intake Total 1550 550 


 


Output Total 2  


 


Balance 1548 550 


 


Weight (lbs) 105.687 kg 0 g 


 


Intake:   


 


  Intake, IV Amount 550 550 


 


    Doxycycline Hyclate 100 100 100 





    mg In Dextrose 5% 100 ml   





    @ 100 mls/hr IV Q12H [facility]   





    Rx#:[id number]   


 


    Piperacillin Sodium/ 200 200 





    Tazobact 4.5 gm In Sodium   





    Chloride 0.9% 100 ml @   





    100 mls/hr IV Q8HR [facility] Rx   





    #:[id number]   


 


    Vancomycin HCl 1 gm In 250 250 





    Sodium Chloride 0.9% 250   





    ml @ 165 mls/hr IV Q12H   





    [facility] Rx#:[id number]   


 


  Oral 1000  


 


Output:   


 


  Stool 2  


 


Other:   


 


  # Voids 4  











Active Medications: 


Current Medications





Acetaminophen (Tylenol)  650 mg PO Q4H PRN


   PRN Reason: Pain (Mild)


   Stop: 18 08:49


   Last Admin: 03/15/18 06:52 Dose:  650 mg


Acetaminophen/Hydrocodone Bitart (Norco 10 Mg/325 Mg)  1 tab PO Q6HR PRN


   PRN Reason: Pain (Moderate)


   Stop: 18 09:55


   Last Admin: 03/15/18 12:50 Dose:  1 tab


Albuterol/Ipratropium (Duoneb Neb)  3 ml HHN Q6HRT [facility]


   Stop: 18 00:59


   Last Admin: 18 06:56 Dose:  3 ml


Albuterol/Ipratropium (Duoneb Neb)  3 ml HHN Q2H PRN


   PRN Reason: Wheezing


   Stop: 18 21:48


   Last Admin: 18 05:19 Dose:  3 ml


Amiodarone HCl (Cordarone)  100 mg PO DAILY [facility]


   Stop: 18 08:59


   Last Admin: 18 08:48 Dose:  100 mg


Artificial Tears (Artificial Tears Ophth Soln)  1 drop EACH EYE DAILY [facility]


   Stop: 18 08:59


   Last Admin: 18 08:49 Dose:  1 drop


Aspirin (Ecotrin)  81 mg PO DAILY [facility]


   Stop: 18 08:59


   Last Admin: 18 08:48 Dose:  81 mg


Bisacodyl (Dulcolax 5 Mg Ec Tab)  5 mg PO DAILY [facility]


   Stop: 18 08:59


   Last Admin: 18 08:48 Dose:  5 mg


Bisacodyl (Dulcolax 10 Mg Supp)  10 mg RC DAILY PRN


   PRN Reason: Constipation


   Stop: 05/15/18 13:02


   Last Admin: 18 15:47 Dose:  10 mg


Clonazepam (Klonopin)  2 mg PO HS [facility]


   Stop: 18 20:59


   Last Admin: 18 21:24 Dose:  2 mg


Docusate Sodium (Colace)  100 mg PO BID PRN


   PRN Reason: Constipation


   Stop: 18 09:55


Furosemide (Lasix)  40 mg PO DAILY [facility]


   Stop: 18 08:59


   Last Admin: 18 08:49 Dose:  40 mg


Gabapentin (Neurontin)  1,200 mg PO TID [facility]


   Stop: 18 13:59


   Last Admin: 18 08:47 Dose:  1,200 mg


Glipizide (Glucotrol)  10 mg PO BIDAC [facility]


   Stop: 18 16:59


   Last Admin: 18 06:55 Dose:  Not Given


Guaifenesin/Dextromethorphan (Robitussin Dm)  10 ml PO TID PRN


   PRN Reason: Cough


   Stop: 18 21:49


   Last Admin: 18 21:24 Dose:  10 ml


Vancomycin HCl 1 gm/ Sodium (Chloride)  250 mls @ 165 mls/hr IV Q12H [facility]


   Stop: 18 11:59


   Last Infusion: 18 02:00 Dose:  Infused


Piperacillin Sod/Tazobactam (Sod 4.5 gm/ Sodium Chloride)  100 mls @ 100 mls/hr 

IV Q8HR [facility]


   Stop: 18 20:59


   Last Infusion: 18 06:55 Dose:  Infused


Doxycycline Hyclate 100 mg/ (Dextrose)  100 mls @ 100 mls/hr IV Q12H [facility]


   Stop: 18 22:29


   Last Infusion: 18 23:27 Dose:  Infused


Insulin Aspart (Novolog Insulin Sliding Scale)  0 units SUBQ Q6HR [facility]


   PRN Reason: Protocol


   Stop: 18 00:00


   Last Admin: 18 06:21 Dose:  Not Given


Lactobacillus Rhamnosus (Culturelle 15b)  1 each PO DAILY [facility]


   Stop: 18 08:59


   Last Admin: 18 08:49 Dose:  1 each


Lactulose (Cephulac)  20 gm PO DAILY PRN


   PRN Reason: Constipation


   Stop: 18 09:55


   Last Admin: 18 10:03 Dose:  20 gm


Levothyroxine Sodium 0.025 mg/ (Levothyroxine Sodium 0.1 mg)  0.125 mg PO QDAC 

[facility]


   Stop: 05/15/18 11:59


   Last Admin: 18 06:54 Dose:  0.125 mg


Metformin HCl (Glucophage)  1,000 mg PO BIDWM [facility]


   Stop: 18 16:59


   Last Admin: 18 08:47 Dose:  1,000 mg


Miscellaneous (Vancomycin Iv Per Pharmacy)  1 [facility] PRN PRN


   PRN Reason: PROTOCOL


   Stop: 18 05:32


Miscellaneous (Probiotic Screen)  1 [facility] PRN PRN


   PRN Reason: PROTOCOL


   Stop: 05/15/18 14:54


Pantoprazole Sodium (Protonix)  40 mg PO BID [facility]


   Stop: 18 16:59


   Last Admin: 18 08:48 Dose:  40 mg


Potassium Chloride (Klor-Con)  20 meq PO DAILY [facility]


   Stop: 18 08:59


   Last Admin: 18 08:48 Dose:  20 meq


Sitagliptin Phosphate (Januvia)  50 mg PO BID [facility]


   Stop: 18 16:59


   Last Admin: 18 08:47 Dose:  50 mg


Tramadol HCl (Ultram)  50 mg PO TID [facility]


   Stop: 18 13:59


   Last Admin: 18 08:49 Dose:  50 mg











- Procedures


Procedures: 


 Procedures











Procedure Code Date


 


ASSISTANCE WITH RESPIRATORY VENTILATION, <24 HRS, CPAP [id number] 18


 


POS AIRWAY PRESSURE CPAP 12363 18














Nutritional Asmnt/Malnutr-PDOC





- Dietary Evaluation


Malnutrition Findings (Please click <Entered> for more info): 








Nutritional Asmnt/Malnutrition                             Start:  18 15:

31


Text:                                                      Status: Complete    

  


Freq:                                                                          

  


 Document     18 15:31  [id number]  (Rec: 18 15:43  [id number]  [id number])


 Nutritional Asmnt/Malnutrition


     Patient General Information


      Nutritional Screening                      High Risk


      Diagnosis                                  PNA


      Pertinent Medical Hx/Surgical Hx           no H&P, not able to obtain


      Subjective Information                      at admitting noted. pt


                                                 seen lying in bed with lunch


                                                 tray in her front. Pt is


                                                 [place] speaking. Per RN, pt


                                                 consumed <25% of lunch and


                                                 refused to eat, complaining


                                                 stomach pain. PO intake 50% of


                                                 breakfast this morning. Pt is


                                                 on insulin noted.


      Current Diet Order/ Nutrition Support      low sodium 2gm


      Pertinent Medications                      lasix, novolog, levemir,


                                                 synthroid, glucophage,


                                                 protonix, kcal


      Pertinent Labs                             3/14 Na 135, Glucose 302, POC


                                                 277-346


                                                 3/13 


     Nutritional Hx/Data


      Height                                     1.47 m


      Height (Calculated Centimeters)            147.3


      Current Weight (lbs)                       105.687 kg


      Weight (Calculated Kilograms)              105.7


      Weight (Calculated Grams)                  903257.0


      Ideal Body Weight                          96


      Body Mass Index (BMI)                      48.6


      Weight Status                              Obese


     GI Symptoms


      GI Symptoms                                None


      Last BM                                    no record


      Difficult in:                              None


      Skin Integrity/Comment:                    intact


      Current %PO                                Poor (25-49%)


     Estimated Nutritional Goals


      BEE in Kcals:                              Adj wt of IBW


      Calories/Kcals/Kg                          30-35


      Kcals Calculated                           9592-6738


      Protein:                                   Adj wt of IBW


      Protein g/k-1.2


      Protein Calculated                         59-71


      Fluid: ml                                  1770-2065ml (1ml/kcal)


     Nutritional Problem


      1. Problem


       Problem                                   altered nutrition related labs


       Etiology                                  endocrine dysfunction


       Signs/Symptoms:                           Glucose 302, -346


     Malnutrition Alert


      Protein-Calorie Malnutrition               N/A


      Is there a minimum of two criteria         No


       selected?                                 


       Query Text:Check all the applicable       


       criteria. A minimum of two criteria are   


       recommended for diagnosis of either       


       severe or non-severe malnutrition.        


     Intervention/Recommendation


      Comments                                   1. Recommend adding CCHO-60gm


                                                 diet d/t hyperglycemia and on


                                                 insulin. RN notified, will


                                                 talk to MD.


                                                 2. Monitor PO intake, wt, labs


                                                 and skin integrity


                                                 3. F/U as high risk in 2-3


                                                 days, 3/16-3/17


     Expected Outcomes/Goals


      Expected Outcomes/Goals                    1. PO intake to meet at least


                                                 75% of nutritional needs.


                                                 2. Wt stability, skin to


                                                 remain intact, labs to


                                                 approach WNL.

## 2018-03-19 LAB
BASOPHILS # BLD AUTO: 0 TH/CUMM (ref 0–0.2)
BASOPHILS NFR BLD AUTO: 0.5 % (ref 0–2)
EOSINOPHIL # BLD AUTO: 0.1 TH/CMM (ref 0.1–0.4)
EOSINOPHIL NFR BLD AUTO: 1.1 % (ref 0–5)
ERYTHROCYTE [DISTWIDTH] IN BLOOD BY AUTOMATED COUNT: 16.7 % (ref 11.5–20)
HCT VFR BLD CALC: 33.3 % (ref 41–60)
HGB BLD-MCNC: 10.8 GM/DL (ref 12–16)
LYMPHOCYTE AB SER FC-ACNC: 1.7 TH/CMM (ref 1.5–3)
LYMPHOCYTES NFR BLD AUTO: 32.2 % (ref 20–50)
MCH RBC QN AUTO: 28 PG (ref 27–31)
MCHC RBC AUTO-ENTMCNC: 32.4 PG (ref 28–36)
MCV RBC AUTO: 86.5 FL (ref 81–100)
MONOCYTES # BLD AUTO: 0.3 TH/CMM (ref 0.3–1)
MONOCYTES NFR BLD AUTO: 5.5 % (ref 2–10)
NEUTROPHILS # BLD: 3.3 TH/CMM (ref 1.8–8)
NEUTROPHILS NFR BLD AUTO: 60.7 % (ref 40–80)
PLATELET # BLD: 202 TH/CMM (ref 150–400)
PMV BLD AUTO: 8.2 FL
RBC # BLD AUTO: 3.85 MIL/CMM (ref 3.8–5.2)
WBC # BLD AUTO: 5.4 TH/CMM (ref 4.8–10.8)

## 2018-03-19 RX ADMIN — Medication SCH EACH: at 08:46

## 2018-03-19 RX ADMIN — IPRATROPIUM BROMIDE AND ALBUTEROL SULFATE SCH ML: .5; 3 SOLUTION RESPIRATORY (INHALATION) at 07:42

## 2018-03-19 RX ADMIN — INSULIN ASPART SCH: 100 INJECTION, SOLUTION INTRAVENOUS; SUBCUTANEOUS at 06:56

## 2018-03-19 RX ADMIN — IPRATROPIUM BROMIDE AND ALBUTEROL SULFATE SCH ML: .5; 3 SOLUTION RESPIRATORY (INHALATION) at 19:37

## 2018-03-19 RX ADMIN — IPRATROPIUM BROMIDE AND ALBUTEROL SULFATE SCH ML: .5; 3 SOLUTION RESPIRATORY (INHALATION) at 01:35

## 2018-03-19 RX ADMIN — POTASSIUM CHLORIDE SCH MEQ: 20 TABLET, EXTENDED RELEASE ORAL at 08:48

## 2018-03-19 RX ADMIN — PANTOPRAZOLE SODIUM SCH MG: 40 TABLET, DELAYED RELEASE ORAL at 08:48

## 2018-03-19 RX ADMIN — POLYVINYL ALCOHOL SCH DROP: 14 SOLUTION/ DROPS OPHTHALMIC at 09:21

## 2018-03-19 RX ADMIN — HYDROCODONE BITATRATE AND ACETAMINOPHEN PRN TAB: 10; 325 TABLET ORAL at 06:22

## 2018-03-19 RX ADMIN — IPRATROPIUM BROMIDE AND ALBUTEROL SULFATE SCH ML: .5; 3 SOLUTION RESPIRATORY (INHALATION) at 13:40

## 2018-03-19 RX ADMIN — INSULIN ASPART SCH: 100 INJECTION, SOLUTION INTRAVENOUS; SUBCUTANEOUS at 00:57

## 2018-03-19 RX ADMIN — INSULIN ASPART SCH: 100 INJECTION, SOLUTION INTRAVENOUS; SUBCUTANEOUS at 17:10

## 2018-03-19 RX ADMIN — INSULIN ASPART SCH: 100 INJECTION, SOLUTION INTRAVENOUS; SUBCUTANEOUS at 11:58

## 2018-03-19 RX ADMIN — DEXTROMETHORPHAN HBR AND GUAIFENESIN PRN ML: 10; 100 SOLUTION ORAL at 09:21

## 2018-03-19 RX ADMIN — PANTOPRAZOLE SODIUM SCH MG: 40 TABLET, DELAYED RELEASE ORAL at 16:41

## 2018-03-19 NOTE — DIAGNOSTIC IMAGING REPORT
CT scan of the chest without intravenous contrast



HISTORY: Ammonia, shortness of breath



Total DLP equals 475



CTDI equals 12.9



Axial sections were obtained from a level above the clavicles down to

level below the diaphragm.



The heart is enlarged.  There is shift of the heart and mediastinum to

the left side.  Mild parenchymal density noted in the left lower lobe

that may be associated with atelectasis and/or consolidation.  There is

incomplete visualization of the margins of the left hilum.  Right hilar

region appears normal.  Underlying hilar pathology cannot be excluded. 

No definite abnormal mediastinal masses.  Mild pleural thickening and

nonspecific parenchymal changes noted in the right lower hemithorax.



Degenerative changes seen to the spine.



IMPRESSION:

1.  Cardiomegaly without sclerotic vascular changes due to volume loss

in the left lower lobe with shift of the heart and mediastinum to the

left.  Findings result in partial obscuration of the margins of the left

hilum.  If needed, a CT scan with intravenous contrast would provide for

further evaluation of the hilar structures.

2.  Nonspecific mild parenchymal changes within the lower lobes of the

chest.

## 2018-03-19 NOTE — INFECTIOUS DISEASE PROG NOTE
Infectious Disease Subjective





- Review of Systems


Service Date: 18


Subjective: 





There is no new change, no fever.  Still  short of breath +





Infectious Disease Objective





- Results


Result Diagrams: 


 18 08:28





 18 05:30


Recent Labs: 


 Laboratory Last Values











WBC  5.4 Th/cmm (4.8-10.8)   18  08:28    


 


RBC  3.85 Mil/cmm (3.80-5.20)   18  08:28    


 


Hgb  10.8 gm/dL (12-16)  L  18  08:28    


 


Hct  33.3 % (41.0-60)  L  18  08:28    


 


MCV  86.5 fl ()   18  08:28    


 


MCH  28.0 pg (27.0-31.0)   18  08:28    


 


MCHC Differential  32.4 pg (28.0-36.0)   18  08:28    


 


RDW  16.7 % (11.5-20.0)   18  08:28    


 


Plt Count  202 Th/cmm (150-400)   18  08:28    


 


MPV  8.2 fl  18  08:28    


 


Neutrophils %  60.7 % (40.0-80.0)   18  08:28    


 


Lymphocytes %  32.2 % (20.0-50.0)   18  08:28    


 


Monocytes %  5.5 % (2.0-10.0)   18  08:28    


 


Eosinophils %  1.1 % (0.0-5.0)   18  08:28    


 


Basophils %  0.5 % (0.0-2.0)   18  08:28    


 


Sodium  140 mEq/L (136-145)   18  05:30    


 


Potassium  3.9 mEq/L (3.5-5.1)   18  05:30    


 


Chloride  102 mEq/L ()   18  05:30    


 


Carbon Dioxide  31.5 mEq/L (21.0-31.0)  H  18  05:30    


 


Anion Gap  10.4  (7.0-16.0)   18  05:30    


 


BUN  18 mg/dL (7-25)   18  05:30    


 


Creatinine  1.0 mg/dL (0.6-1.2)   18  05:30    


 


Est GFR ( Amer)  TNP   18  05:30    


 


Est GFR (Non-Af Amer)  TNP   18  05:30    


 


BUN/Creatinine Ratio  18.0   18  05:30    


 


Glucose  125 mg/dL ()  H  18  05:30    


 


POC Glucose  141 MG/DL (70 - 105)  H  18  11:43    


 


Hemoglobin A1c %  7.9 % (4.0-6.0)  H  18  05:42    


 


Calcium  8.9 mg/dL (8.6-10.3)   18  05:30    


 


Total Bilirubin  0.5 mg/dL (0.3-1.0)   18  05:42    


 


AST  24 U/L (13-39)   18  05:42    


 


ALT  23 U/L (7-52)   18  05:42    


 


Alkaline Phosphatase  75 U/L ()   18  05:42    


 


Troponin I  0.08 ng/mL (0.01-0.05)  H*  03/15/18  06:02    


 


B-Natriuretic Peptide  558.0 pg/mL (5.0-100.0)  H  03/15/18  06:02    


 


Total Protein  6.9 gm/dL (6.0-8.3)   18  05:42    


 


Albumin  3.7 gm/dL (3.7-5.3)   18  05:42    


 


Globulin  3.2 gm/dL  18  05:42    


 


Albumin/Globulin Ratio  1.2  (1.0-1.8)   18  05:42    


 


Triglycerides  137 mg/dL (<150)   18  20:21    


 


Cholesterol  249 mg/dL (<200)  H  18  20:21    


 


LDL Cholesterol Direct  174 mg/dL ()   18  20:21    


 


HDL Cholesterol  48 mg/dL (23-92)   18  20:21    


 


TSH  6.80 uIU/ml (0.34-5.60)  H  18  20:21    


 


Vancomycin Trough  23.4 ug/mL (10-20)  H  18  11:10    














- Physical Exam


Vitals and I&O: 


 Vital Signs











Temp  98.2 F   18 11:32


 


Pulse  76   18 13:42


 


Resp  18   18 13:42


 


BP  112/68   18 11:32


 


Pulse Ox  96   18 13:42








 Intake & Output











 18





 18:59 06:59 18:59


 


Intake Total 700 700 340


 


Balance 700 700 340


 


Weight (lbs)  103.873 kg 103.873 kg


 


Intake:   


 


  Intake, IV Amount 700 200 100


 


    Doxycycline Hyclate 100 100 100 





    mg In Dextrose 5% 100 ml   





    @ 100 mls/hr IV Q12H [facility]   





    Rx#:[id number]   


 


    Piperacillin Sodium/ 100 100 100





    Tazobact 4.5 gm In Sodium   





    Chloride 0.9% 100 ml @   





    100 mls/hr IV Q8HR [facility] Rx   





    #:[id number]   


 


    Vancomycin HCl 1.5 gm In 500  





    Sodium Chloride 0.9% 500   





    ml @ 250 mls/hr IV Q24H   





    [facility] Rx#:[id number]   


 


  Oral  500 240


 


Other:   


 


  # Voids  4 4


 


  # Bowel Movements  0 0


 


  Stool Characteristics   Soft











Active Medications: 


Current Medications





Acetaminophen (Tylenol)  650 mg PO Q4H PRN


   PRN Reason: Pain (Mild)


   Stop: 18 08:49


   Last Admin: 03/15/18 06:52 Dose:  650 mg


Acetaminophen/Hydrocodone Bitart (Norco 10 Mg/325 Mg)  1 tab PO Q6HR PRN


   PRN Reason: Pain (Moderate)


   Stop: 18 09:55


   Last Admin: 18 06:22 Dose:  1 tab


Albuterol/Ipratropium (Duoneb Neb)  3 ml HHN Q6HRT [facility]


   Stop: 18 00:59


   Last Admin: 18 13:40 Dose:  3 ml


Albuterol/Ipratropium (Duoneb Neb)  3 ml HHN Q2H PRN


   PRN Reason: Wheezing


   Stop: 18 21:48


   Last Admin: 18 05:19 Dose:  3 ml


Amiodarone HCl (Cordarone)  100 mg PO DAILY [facility]


   Stop: 18 08:59


   Last Admin: 18 08:49 Dose:  100 mg


Artificial Tears (Artificial Tears Ophth Soln)  1 drop EACH EYE DAILY [facility]


   Stop: 18 08:59


   Last Admin: 18 09:21 Dose:  1 drop


Aspirin (Ecotrin)  81 mg PO DAILY [facility]


   Stop: 18 08:59


   Last Admin: 18 08:47 Dose:  81 mg


Benzocaine/Menthol (Cepacol)  1 eufemia MM Q6HR PRN


   PRN Reason: Sore Throat


   Stop: 18 16:52


Bisacodyl (Dulcolax 5 Mg Ec Tab)  5 mg PO DAILY [facility]


   Stop: 18 08:59


   Last Admin: 18 08:48 Dose:  5 mg


Bisacodyl (Dulcolax 10 Mg Supp)  10 mg RC DAILY PRN


   PRN Reason: Constipation


   Stop: 05/15/18 13:02


   Last Admin: 18 15:47 Dose:  10 mg


Clonazepam (Klonopin)  2 mg PO HS [facility]


   Stop: 18 20:59


   Last Admin: 18 21:33 Dose:  2 mg


Docusate Sodium (Colace)  100 mg PO BID PRN


   PRN Reason: Constipation


   Stop: 18 09:55


Furosemide (Lasix)  40 mg PO DAILY [facility]


   Stop: 18 08:59


   Last Admin: 18 08:48 Dose:  40 mg


Gabapentin (Neurontin)  1,200 mg PO TID [facility]


   Stop: 18 13:59


   Last Admin: 18 08:46 Dose:  1,200 mg


Glipizide (Glucotrol)  10 mg PO BIDAC [facility]


   Stop: 18 16:59


   Last Admin: 18 06:56 Dose:  Not Given


Guaifenesin/Dextromethorphan (Robitussin Dm)  10 ml PO TID PRN


   PRN Reason: Cough


   Stop: 18 21:49


   Last Admin: 18 09:21 Dose:  10 ml


Piperacillin Sod/Tazobactam (Sod 4.5 gm/ Sodium Chloride)  100 mls @ 100 mls/hr 

IV Q8HR [facility]


   Stop: 18 20:59


   Last Admin: 18 13:17 Dose:  100 mls/hr


Doxycycline Hyclate 100 mg/ (Dextrose)  100 mls @ 100 mls/hr IV Q12H [facility]


   Stop: 18 22:29


   Last Admin: 18 10:48 Dose:  100 mls/hr


Vancomycin HCl 1.5 gm/ Sodium (Chloride)  500 mls @ 250 mls/hr IV Q24H [facility]


   Stop: 18 13:59


   Last Infusion: 18 17:05 Dose:  Infused


Insulin Aspart (Novolog Insulin Sliding Scale)  0 units SUBQ Q6HR [facility]


   PRN Reason: Protocol


   Stop: 18 00:00


   Last Admin: 18 11:58 Dose:  Not Given


Lactobacillus Rhamnosus (Culturelle 15b)  1 each PO DAILY [facility]


   Stop: 18 08:59


   Last Admin: 18 08:46 Dose:  1 each


Lactulose (Cephulac)  20 gm PO DAILY PRN


   PRN Reason: Constipation


   Stop: 18 09:55


   Last Admin: 18 10:03 Dose:  20 gm


Levothyroxine Sodium 0.025 mg/ (Levothyroxine Sodium 0.1 mg)  0.125 mg PO QDAC 

[facility]


   Stop: 05/15/18 11:59


   Last Admin: 18 06:56 Dose:  0.125 mg


Metformin HCl (Glucophage)  1,000 mg PO BIDWM [facility]


   Stop: 18 16:59


   Last Admin: 18 08:46 Dose:  1,000 mg


Miscellaneous (Vancomycin Iv Per Pharmacy)  1 ea  PRN PRN


   PRN Reason: PROTOCOL


   Stop: 18 05:32


Miscellaneous (Probiotic Screen)  1 ea  PRN PRN


   PRN Reason: PROTOCOL


   Stop: 05/15/18 14:54


Pantoprazole Sodium (Protonix)  40 mg PO BID [facility]


   Stop: 18 16:59


   Last Admin: 18 08:48 Dose:  40 mg


Potassium Chloride (Klor-Con)  20 meq PO DAILY [facility]


   Stop: 18 08:59


   Last Admin: 18 08:48 Dose:  20 meq


Sitagliptin Phosphate (Januvia)  50 mg PO BID [facility]


   Stop: 18 16:59


   Last Admin: 18 08:49 Dose:  50 mg


Tramadol HCl (Ultram)  50 mg PO TID [facility]


   Stop: 18 13:59


   Last Admin: 18 08:48 Dose:  50 mg








General: no acute distress, well developed, well nourished


HEENT: atraumatic, normocephalic, PERRLA


Neck: supple, no thyromegaly


Cardiovascular: S1S2, regular


Lungs: clear to auscultation bilaterally, clear to percussion, rhonchi


Abdomen: soft, no tender, no distended, no mass, no hepatomegaly


Extremities: no cyanosis, no clubbing, no edema


Neurological: awake, alert, oriented


Skin: intact





- Procedures


Procedures: 


 Procedures











Procedure Code Date


 


ASSISTANCE WITH RESPIRATORY VENTILATION, <24 HRS, CPAP [id number]


 


POS AIRWAY PRESSURE CPAP 46710 18














Infectious Disease Assmt/Plan





- Assessment


Assessment: 


1.  Pneumonia.


2.  Chronic obstructive pulmonary disease exacerbation.


3.  Sleep apnea.


4.  Obesity.


5.  Congestive heart failure.


6.  History of arrhythmia.


7.  Morbid obesity.








- Plan


Plan: 


continue vancomycin, Zosyn and doxycycline.











Nutritional Asmnt/Malnutr-PDOC





- Dietary Evaluation


Malnutrition Findings (Please click <Entered> for more info): 








Nutritional Asmnt/Malnutrition                             Start:  18 15:

31


Text:                                                      Status: Complete    

  


Freq:                                                                          

  


 Document     18 15:31  LCHENG  (Rec: 18 15:43  LCHENG  [id number])


 Nutritional Asmnt/Malnutrition


     Patient General Information


      Nutritional Screening                      High Risk


      Diagnosis                                  PNA


      Pertinent Medical Hx/Surgical Hx           no H&P, not able to obtain


      Subjective Information                      at admitting noted. pt


                                                 seen lying in bed with lunch


                                                 tray in her front. Pt is


                                                 Portuguese speaking. Per RN, pt


                                                 consumed <25% of lunch and


                                                 refused to eat, complaining


                                                 stomach pain. PO intake 50% of


                                                 breakfast this morning. Pt is


                                                 on insulin noted.


      Current Diet Order/ Nutrition Support      low sodium 2gm


      Pertinent Medications                      lasix, novolog, levemir,


                                                 synthroid, glucophage,


                                                 protonix, kcal


      Pertinent Labs                             3/14 Na 135, Glucose 302, POC


                                                 277-346


                                                 3/13 


     Nutritional Hx/Data


      Height                                     1.47 m


      Height (Calculated Centimeters)            147.3


      Current Weight (lbs)                       105.687 kg


      Weight (Calculated Kilograms)              105.7


      Weight (Calculated Grams)                  438055.0


      Ideal Body Weight                          96


      Body Mass Index (BMI)                      48.6


      Weight Status                              Obese


     GI Symptoms


      GI Symptoms                                None


      Last BM                                    no record


      Difficult in:                              None


      Skin Integrity/Comment:                    intact


      Current %PO                                Poor (25-49%)


     Estimated Nutritional Goals


      BEE in Kcals:                              Adj wt of IBW


      Calories/Kcals/Kg                          30-35


      Kcals Calculated                           9765-1846


      Protein:                                   Adj wt of IBW


      Protein g/k-1.2


      Protein Calculated                         59-71


      Fluid: ml                                  1770-2065ml (1ml/kcal)


     Nutritional Problem


      1. Problem


       Problem                                   altered nutrition related labs


       Etiology                                  endocrine dysfunction


       Signs/Symptoms:                           Glucose 302, -346


     Malnutrition Alert


      Protein-Calorie Malnutrition               N/A


      Is there a minimum of two criteria         No


       selected?                                 


       Query Text:Check all the applicable       


       criteria. A minimum of two criteria are   


       recommended for diagnosis of either       


       severe or non-severe malnutrition.        


     Intervention/Recommendation


      Comments                                   1. Recommend adding CCHO-60gm


                                                 diet d/t hyperglycemia and on


                                                 insulin. RN notified, will


                                                 talk to MD.


                                                 2. Monitor PO intake, wt, labs


                                                 and skin integrity


                                                 3. F/U as high risk in 2-3


                                                 days, 3/16-3/17


     Expected Outcomes/Goals


      Expected Outcomes/Goals                    1. PO intake to meet at least


                                                 75% of nutritional needs.


                                                 2. Wt stability, skin to


                                                 remain intact, labs to


                                                 approach WNL.

## 2018-03-19 NOTE — GENERAL PROGRESS NOTE
Subjective





- Review of Systems


Events since last encounter: 





no fever


+ sob


in no  distress 





Objective





- Results


Result Diagrams: 


 18 08:28





 18 05:30


Recent Labs: 


 Laboratory Last Values











WBC  5.4 Th/cmm (4.8-10.8)   18  08:28    


 


RBC  3.85 Mil/cmm (3.80-5.20)   18  08:28    


 


Hgb  10.8 gm/dL (12-16)  L  18  08:28    


 


Hct  33.3 % (41.0-60)  L  18  08:28    


 


MCV  86.5 fl ()   18  08:28    


 


MCH  28.0 pg (27.0-31.0)   18  08:28    


 


MCHC Differential  32.4 pg (28.0-36.0)   18  08:28    


 


RDW  16.7 % (11.5-20.0)   18  08:28    


 


Plt Count  202 Th/cmm (150-400)   18  08:28    


 


MPV  8.2 fl  18  08:28    


 


Neutrophils %  60.7 % (40.0-80.0)   18  08:28    


 


Lymphocytes %  32.2 % (20.0-50.0)   18  08:28    


 


Monocytes %  5.5 % (2.0-10.0)   18  08:28    


 


Eosinophils %  1.1 % (0.0-5.0)   18  08:28    


 


Basophils %  0.5 % (0.0-2.0)   18  08:28    


 


Sodium  140 mEq/L (136-145)   18  05:30    


 


Potassium  3.9 mEq/L (3.5-5.1)   18  05:30    


 


Chloride  102 mEq/L ()   18  05:30    


 


Carbon Dioxide  31.5 mEq/L (21.0-31.0)  H  18  05:30    


 


Anion Gap  10.4  (7.0-16.0)   18  05:30    


 


BUN  18 mg/dL (7-25)   18  05:30    


 


Creatinine  1.0 mg/dL (0.6-1.2)   18  05:30    


 


Est GFR ( Amer)  TNP   18  05:30    


 


Est GFR (Non-Af Amer)  TNP   18  05:30    


 


BUN/Creatinine Ratio  18.0   18  05:30    


 


Glucose  125 mg/dL ()  H  18  05:30    


 


POC Glucose  75 MG/DL (70 - 105)   18  06:08    


 


Hemoglobin A1c %  7.9 % (4.0-6.0)  H  18  05:42    


 


Calcium  8.9 mg/dL (8.6-10.3)   18  05:30    


 


Total Bilirubin  0.5 mg/dL (0.3-1.0)   18  05:42    


 


AST  24 U/L (13-39)   18  05:42    


 


ALT  23 U/L (7-52)   18  05:42    


 


Alkaline Phosphatase  75 U/L ()   18  05:42    


 


Troponin I  0.08 ng/mL (0.01-0.05)  H*  03/15/18  06:02    


 


B-Natriuretic Peptide  558.0 pg/mL (5.0-100.0)  H  03/15/18  06:02    


 


Total Protein  6.9 gm/dL (6.0-8.3)   18  05:42    


 


Albumin  3.7 gm/dL (3.7-5.3)   18  05:42    


 


Globulin  3.2 gm/dL  18  05:42    


 


Albumin/Globulin Ratio  1.2  (1.0-1.8)   18  05:42    


 


Triglycerides  137 mg/dL (<150)   18  20:21    


 


Cholesterol  249 mg/dL (<200)  H  18  20:21    


 


LDL Cholesterol Direct  174 mg/dL ()   18  20:21    


 


HDL Cholesterol  48 mg/dL (23-92)   18  20:21    


 


TSH  6.80 uIU/ml (0.34-5.60)  H  18  20:21    


 


Vancomycin Trough  23.4 ug/mL (10-20)  H  18  11:10    














- Physical Exam


Vitals and I&O: 


 Vital Signs











Temp  98.2 F   18 11:32


 


Pulse  67   18 11:32


 


Resp  17   18 11:32


 


BP  112/68   18 11:32


 


Pulse Ox  97   18 11:32








 Intake & Output











 18





 18:59 06:59 18:59


 


Intake Total 700 700 240


 


Balance 700 700 240


 


Weight (lbs)  103.873 kg 103.873 kg


 


Intake:   


 


  Intake, IV Amount 700 200 


 


    Doxycycline Hyclate 100 100 100 





    mg In Dextrose 5% 100 ml   





    @ 100 mls/hr IV Q12H [facility]   





    Rx#:[id number]   


 


    Piperacillin Sodium/ 100 100 





    Tazobact 4.5 gm In Sodium   





    Chloride 0.9% 100 ml @   





    100 mls/hr IV Q8HR [facility] Rx   





    #:[id number]   


 


    Vancomycin HCl 1.5 gm In 500  





    Sodium Chloride 0.9% 500   





    ml @ 250 mls/hr IV Q24H   





    [facility] Rx#:[id number]   


 


  Oral  500 240


 


Other:   


 


  # Voids  4 4


 


  # Bowel Movements  0 0


 


  Stool Characteristics   Soft











Active Medications: 


Current Medications





Acetaminophen (Tylenol)  650 mg PO Q4H PRN


   PRN Reason: Pain (Mild)


   Stop: 18 08:49


   Last Admin: 03/15/18 06:52 Dose:  650 mg


Acetaminophen/Hydrocodone Bitart (Norco 10 Mg/325 Mg)  1 tab PO Q6HR PRN


   PRN Reason: Pain (Moderate)


   Stop: 18 09:55


   Last Admin: 18 06:22 Dose:  1 tab


Albuterol/Ipratropium (Duoneb Neb)  3 ml HHN Q6HRT [facility]


   Stop: 18 00:59


   Last Admin: 18 07:42 Dose:  3 ml


Albuterol/Ipratropium (Duoneb Neb)  3 ml HHN Q2H PRN


   PRN Reason: Wheezing


   Stop: 18 21:48


   Last Admin: 18 05:19 Dose:  3 ml


Amiodarone HCl (Cordarone)  100 mg PO DAILY [facility]


   Stop: 18 08:59


   Last Admin: 18 08:49 Dose:  100 mg


Artificial Tears (Artificial Tears Ophth Soln)  1 drop EACH EYE DAILY [facility]


   Stop: 18 08:59


   Last Admin: 18 09:21 Dose:  1 drop


Aspirin (Ecotrin)  81 mg PO DAILY [facility]


   Stop: 18 08:59


   Last Admin: 18 08:47 Dose:  81 mg


Benzocaine/Menthol (Cepacol)  1 eufemia MM Q6HR PRN


   PRN Reason: Sore Throat


   Stop: 18 16:52


Bisacodyl (Dulcolax 5 Mg Ec Tab)  5 mg PO DAILY [facility]


   Stop: 18 08:59


   Last Admin: 18 08:48 Dose:  5 mg


Bisacodyl (Dulcolax 10 Mg Supp)  10 mg RC DAILY PRN


   PRN Reason: Constipation


   Stop: 05/15/18 13:02


   Last Admin: 18 15:47 Dose:  10 mg


Clonazepam (Klonopin)  2 mg PO HS [facility]


   Stop: 18 20:59


   Last Admin: 18 21:33 Dose:  2 mg


Docusate Sodium (Colace)  100 mg PO BID PRN


   PRN Reason: Constipation


   Stop: 18 09:55


Furosemide (Lasix)  40 mg PO DAILY [facility]


   Stop: 18 08:59


   Last Admin: 18 08:48 Dose:  40 mg


Gabapentin (Neurontin)  1,200 mg PO TID [facility]


   Stop: 18 13:59


   Last Admin: 18 08:46 Dose:  1,200 mg


Glipizide (Glucotrol)  10 mg PO BIDAC [facility]


   Stop: 18 16:59


   Last Admin: 18 06:56 Dose:  Not Given


Guaifenesin/Dextromethorphan (Robitussin Dm)  10 ml PO TID PRN


   PRN Reason: Cough


   Stop: 18 21:49


   Last Admin: 18 09:21 Dose:  10 ml


Piperacillin Sod/Tazobactam (Sod 4.5 gm/ Sodium Chloride)  100 mls @ 100 mls/hr 

IV Q8HR [facility]


   Stop: 18 20:59


   Last Admin: 18 06:21 Dose:  100 mls/hr


Doxycycline Hyclate 100 mg/ (Dextrose)  100 mls @ 100 mls/hr IV Q12H [facility]


   Stop: 18 22:29


   Last Admin: 18 10:48 Dose:  100 mls/hr


Vancomycin HCl 1.5 gm/ Sodium (Chloride)  500 mls @ 250 mls/hr IV Q24H [facility]


   Stop: 18 13:59


   Last Infusion: 18 17:05 Dose:  Infused


Insulin Aspart (Novolog Insulin Sliding Scale)  0 units SUBQ Q6HR [facility]


   PRN Reason: Protocol


   Stop: 18 00:00


   Last Admin: 18 06:56 Dose:  Not Given


Lactobacillus Rhamnosus (Culturelle 15b)  1 each PO DAILY [facility]


   Stop: 18 08:59


   Last Admin: 18 08:46 Dose:  1 each


Lactulose (Cephulac)  20 gm PO DAILY PRN


   PRN Reason: Constipation


   Stop: 18 09:55


   Last Admin: 18 10:03 Dose:  20 gm


Levothyroxine Sodium 0.025 mg/ (Levothyroxine Sodium 0.1 mg)  0.125 mg PO QDAC 

[facility]


   Stop: 05/15/18 11:59


   Last Admin: 18 06:56 Dose:  0.125 mg


Metformin HCl (Glucophage)  1,000 mg PO BIDWM [facility]


   Stop: 18 16:59


   Last Admin: 18 08:46 Dose:  1,000 mg


Miscellaneous (Vancomycin Iv Per Pharmacy)  1 ea  PRN PRN


   PRN Reason: PROTOCOL


   Stop: 18 05:32


Miscellaneous (Probiotic Screen)  1 ea  PRN PRN


   PRN Reason: PROTOCOL


   Stop: 05/15/18 14:54


Pantoprazole Sodium (Protonix)  40 mg PO BID [facility]


   Stop: 18 16:59


   Last Admin: 18 08:48 Dose:  40 mg


Potassium Chloride (Klor-Con)  20 meq PO DAILY [facility]


   Stop: 18 08:59


   Last Admin: 18 08:48 Dose:  20 meq


Sitagliptin Phosphate (Januvia)  50 mg PO BID [facility]


   Stop: 18 16:59


   Last Admin: 18 08:49 Dose:  50 mg


Tramadol HCl (Ultram)  50 mg PO TID [facility]


   Stop: 18 13:59


   Last Admin: 18 08:48 Dose:  50 mg











- Procedures


Procedures: 


 Procedures











Procedure Code Date


 


ASSISTANCE WITH RESPIRATORY VENTILATION, <24 HRS, CPAP [id number] 18


 


POS AIRWAY PRESSURE CPAP 23640 18














Nutritional Asmnt/Malnutr-PDOC





- Dietary Evaluation


Malnutrition Findings (Please click <Entered> for more info): 








Nutritional Asmnt/Malnutrition                             Start:  18 15:

31


Text:                                                      Status: Complete    

  


Freq:                                                                          

  


 Document     18 15:31  LCHENG  (Rec: 18 15:43  LCHENG  [id number])


 Nutritional Asmnt/Malnutrition


     Patient General Information


      Nutritional Screening                      High Risk


      Diagnosis                                  PNA


      Pertinent Medical Hx/Surgical Hx           no H&P, not able to obtain


      Subjective Information                      at admitting noted. pt


                                                 seen lying in bed with lunch


                                                 tray in her front. Pt is


                                                 Bulgarian speaking. Per RN, pt


                                                 consumed <25% of lunch and


                                                 refused to eat, complaining


                                                 stomach pain. PO intake 50% of


                                                 breakfast this morning. Pt is


                                                 on insulin noted.


      Current Diet Order/ Nutrition Support      low sodium 2gm


      Pertinent Medications                      lasix, novolog, levemir,


                                                 synthroid, glucophage,


                                                 protonix, kcal


      Pertinent Labs                             3/14 Na 135, Glucose 302, POC


                                                 277-346


                                                 3/13 


     Nutritional Hx/Data


      Height                                     1.47 m


      Height (Calculated Centimeters)            147.3


      Current Weight (lbs)                       105.687 kg


      Weight (Calculated Kilograms)              105.7


      Weight (Calculated Grams)                  073285.0


      Ideal Body Weight                          96


      Body Mass Index (BMI)                      48.6


      Weight Status                              Obese


     GI Symptoms


      GI Symptoms                                None


      Last BM                                    no record


      Difficult in:                              None


      Skin Integrity/Comment:                    intact


      Current %PO                                Poor (25-49%)


     Estimated Nutritional Goals


      BEE in Kcals:                              Adj wt of IBW


      Calories/Kcals/Kg                          30-35


      Kcals Calculated                           6469-5773


      Protein:                                   Adj wt of IBW


      Protein g/k-1.2


      Protein Calculated                         59-71


      Fluid: ml                                  1770-2065ml (1ml/kcal)


     Nutritional Problem


      1. Problem


       Problem                                   altered nutrition related labs


       Etiology                                  endocrine dysfunction


       Signs/Symptoms:                           Glucose 302, -346


     Malnutrition Alert


      Protein-Calorie Malnutrition               N/A


      Is there a minimum of two criteria         No


       selected?                                 


       Query Text:Check all the applicable       


       criteria. A minimum of two criteria are   


       recommended for diagnosis of either       


       severe or non-severe malnutrition.        


     Intervention/Recommendation


      Comments                                   1. Recommend adding CCHO-60gm


                                                 diet d/t hyperglycemia and on


                                                 insulin. RN notified, will


                                                 talk to MD.


                                                 2. Monitor PO intake, wt, labs


                                                 and skin integrity


                                                 3. F/U as high risk in 2-3


                                                 days, 3/16-3/17


     Expected Outcomes/Goals


      Expected Outcomes/Goals                    1. PO intake to meet at least


                                                 75% of nutritional needs.


                                                 2. Wt stability, skin to


                                                 remain intact, labs to


                                                 approach WNL.